# Patient Record
Sex: MALE | Race: WHITE | NOT HISPANIC OR LATINO | Employment: UNEMPLOYED | URBAN - METROPOLITAN AREA
[De-identification: names, ages, dates, MRNs, and addresses within clinical notes are randomized per-mention and may not be internally consistent; named-entity substitution may affect disease eponyms.]

---

## 2022-01-13 ENCOUNTER — HOSPITAL ENCOUNTER (EMERGENCY)
Facility: CLINIC | Age: 37
Discharge: HOME | End: 2022-01-13
Attending: EMERGENCY MEDICINE | Admitting: EMERGENCY MEDICINE
Payer: MEDICAID

## 2022-01-13 VITALS
HEART RATE: 106 BPM | SYSTOLIC BLOOD PRESSURE: 141 MMHG | WEIGHT: 192 LBS | DIASTOLIC BLOOD PRESSURE: 88 MMHG | TEMPERATURE: 98.2 F | OXYGEN SATURATION: 97 % | RESPIRATION RATE: 20 BRPM

## 2022-01-13 DIAGNOSIS — K02.9 DENTAL CARIES: ICD-10-CM

## 2022-01-13 DIAGNOSIS — K04.7 TOOTH ABSCESS: ICD-10-CM

## 2022-01-13 PROCEDURE — 64400 NJX AA&/STRD TRIGEMINAL NRV: CPT

## 2022-01-13 PROCEDURE — 99283 EMERGENCY DEPT VISIT LOW MDM: CPT | Mod: 25

## 2022-01-13 PROCEDURE — 250N000013 HC RX MED GY IP 250 OP 250 PS 637: Performed by: EMERGENCY MEDICINE

## 2022-01-13 RX ORDER — CHLORHEXIDINE GLUCONATE ORAL RINSE 1.2 MG/ML
15 SOLUTION DENTAL 2 TIMES DAILY
Qty: 473 ML | Refills: 0 | Status: SHIPPED | OUTPATIENT
Start: 2022-01-13 | End: 2024-05-31

## 2022-01-13 RX ORDER — PENICILLIN V POTASSIUM 250 MG/1
500 TABLET, FILM COATED ORAL ONCE
Status: COMPLETED | OUTPATIENT
Start: 2022-01-13 | End: 2022-01-13

## 2022-01-13 RX ORDER — PENICILLIN V POTASSIUM 500 MG/1
500 TABLET, FILM COATED ORAL 4 TIMES DAILY
Qty: 40 TABLET | Refills: 0 | Status: SHIPPED | OUTPATIENT
Start: 2022-01-13 | End: 2022-01-23

## 2022-01-13 RX ADMIN — PENICILLIN V POTASSIUM 500 MG: 250 TABLET, FILM COATED ORAL at 13:34

## 2022-01-13 NOTE — DISCHARGE INSTRUCTIONS
Your tooth nerve block will provide relief for the next several hours. From there, you can continue taking Ibuprofen as needed. Take your antibiotics for the next 10 days to help decrease abscess and infection. Also use the chlorhexidine mouth solution like mouth wash 1-2 times a day. You will need to see a dentist to have your tooth removed as soon as possible.    Nursing please provide dental information resources.    Dosing of medications as needed:  Tylenol: 1000 mg 4 times a day  Ibuprofen: 400 mg 3 times a day

## 2022-01-13 NOTE — ED TRIAGE NOTES
Pt here with right sided facial swelling and pain. Was told tooth was cracked. States he is unable to get in to the dentist right now. Started 3-4 days ago.

## 2022-01-13 NOTE — ED PROVIDER NOTES
EMERGENCY DEPARTMENT ENCOUNTER      NAME: Burke Domingo  AGE: 36 year old male  YOB: 1985  MRN: [id number]  EVALUATION DATE & TIME: 1/13/2022 12:55 PM    PCP: No primary care provider on file.    ED PROVIDER: Komal Green MD    Chief Complaint   Patient presents with     Dental Pain     Facial Swelling         FINAL IMPRESSION:  No diagnosis found.      ED COURSE & MEDICAL DECISION MAKING:    Pertinent Labs & Imaging studies reviewed. (See chart for details)  36 year old male with history of *** who presents to the Emergency Department for evaluation of ***           1:00 PM I met with the patient, obtained history, performed an initial exam, and discussed options and plan for diagnostics and treatment here in the ED.   1:27 PM I performed a dental block.  1:49 PM Patient ready for discharge.    At the conclusion of the encounter I discussed the results of all of the tests and the disposition. The questions were answered. The patient or family acknowledged understanding and was agreeable with the care plan.    CONSULTS:  ***    CRITICAL CARE:  ***    MEDICATIONS GIVEN IN THE EMERGENCY:  Medications   penicillin V (VEETID) tablet 500 mg (has no administration in time range)       NEW PRESCRIPTIONS STARTED AT TODAY'S ER VISIT  New Prescriptions    No medications on file          =================================================================    HPI    Patient information was obtained from: ***    Use of Intrepreter: N/A ***, Yes (MARTII *** Phone ***In Person) - Language ***       Burke Domingo is a 36 year old male with pertinent medical history of *** who presents ***      REVIEW OF SYSTEMS  Constitutional:  Denies fever, chills, weight loss or weakness  Eyes:  No pain, discharge, redness  HENT:  Denies sore throat, ear pain, congestion  Respiratory: No SOB, wheeze or cough  Cardiovascular:  No CP, palpitations  GI:  Denies abdominal pain, nausea, vomiting, diarrhea  : Denies dysuria, denies  hematuria  Musculoskeletal:  Denies any new muscle/joint pain, swelling or loss of function.  Skin:  Denies rash, pallor  Neurologic:  Denies headache, focal weakness or sensory changes  Lymph: Denies swollen nodes    All other systems negative unless noted in HPI.      PAST MEDICAL HISTORY:  History reviewed. No pertinent past medical history.    PAST SURGICAL HISTORY:  History reviewed. No pertinent surgical history.    CURRENT MEDICATIONS:    None       ALLERGIES:  Allergies   Allergen Reactions     Morphine Shortness Of Breath and Rash     Apple [Apple Fruit Extract] Unknown       FAMILY HISTORY:  History reviewed. No pertinent family history.    SOCIAL HISTORY:  Social History     Tobacco Use     Smoking status: None     Smokeless tobacco: None   Substance Use Topics     Alcohol use: None     Drug use: None        VITALS:  Patient Vitals for the past 24 hrs:   BP Temp Pulse Resp SpO2 Weight   01/13/22 1251 -- -- -- -- -- 87.1 kg (192 lb)   01/13/22 1247 (!) 141/88 98.2  F (36.8  C) 106 20 97 % --       PHYSICAL EXAM    General Appearance: Well-appearing, well-nourished, no acute distress ***  Head:  Normocephalic, atraumatic  Eyes:  PERRL, conjunctiva/corneas clear, EOM's intact  ENT:  Lips, mucosa, and tongue normal; membranes are moist without pallor  Neck:  Supple, symmetrical, trachea midline, no adenopathy  Chest:  No tenderness or deformity, no crepitus  Cardio:  Regular rate and rhythm, no murmur/gallop/rub, 2+ pulses symmetric in all extremities  Pulm:  No respiratory distress, clear to auscultation bilaterally  Back:  No midline tenderness to palpation, no paraspinal tenderness, No CVA tenderness, normal ROM  Abdomen:  Soft, non-tender, non distended,no rebound or guarding.  Extremities:  Extremities normal, atraumatic, no cyanosis or edema, full ROM and motor tone intact, bilateral pulses intact upper and lower  Skin:  Skin warm, dry, no rashes  Neuro:  Alert and oriented ×3, moving all extremities,  no gross sensory defects     RADIOLOGY/LABS:  Reviewed all pertinent imaging. Please see official radiology report. All pertinent labs reviewed and interpreted.         EKG:  Performed at: ***    Impression: ***    Rate: ***  Rhythm: ***  Axis: ***  FL Interval: ***  QRS Interval: ***  QTc Interval: ***  ST Changes: ***  Comparison: ***  I have independently reviewed and interpreted the EKG(s) documented above.    The creation of this record is based on the scribe s observations of the work being performed by Komal Green MD and the provider s statements to them. It was created on his behalf by Fidelia Junior, a trained medical scribe. This document has been checked and approved by the attending provider.    Komal Green MD  Emergency Medicine  [facility] EMERGENCY ROOM  [address] [facility] [id number] [phone]  Dept: [phone]

## 2022-01-13 NOTE — ED PROVIDER NOTES
IKomal have reviewed the documentation, personally taken the patient's history, performed an exam and agree with the physical finds, diagnosis and management plan.    HPI:  35 y/o m here for c/o dental pain.  Worsening after he chipped his right mandibular posterior most molar on a hard candy several days ago.  Patient notes some swelling to the right face.  He notes that when he pushes on that area and sucks he can get some pus out of the dental bed.    Physical Exam: On exam patient is well-appearing.  He does have some swelling over the right mandibular region anterior to the angle of the jaw.  There is no overlying erythema within this region.  The floor the mouth is soft and overall his dentition is quite poor with multiple carious teeth.  The mandibular posterior most molar is largely carious with a fracture of one of the cusps and pus visible with exposed pulp.  There is not however any palpable or drainable abscess along the gingival or buccal mucosa.    MDM: Symptoms are consistent with dental carry/dental fracture with associated likely periapical abscess.  Again no evidence of drainable abscess, facial cellulitis, Garry's angina.    ED Course/workup: Patient offered dental block, which he was agreeable to.  Given inferior alveolar block, see procedure note below which he tolerated well and had good relief thereafter.  Given Pen-Vee K, prescription for Pen-Vee K, chlorhexidine, alternating Tylenol ibuprofen and dental resources for home.             PROCEDURES:  PROCEDURE: Dental Nerve Block   INDICATIONS: Dental pain   PROCEDURE PROVIDER: Dr Komal Green   SITE: Right Inferior Alveolar (mandibular) Nerve to achieve anesthesia of right posterior most mandibular molar     MEDICATION: 2 mL of 0.25% Bupivacaine without epinephrine   NOTE: The usual right mandibular landmarks were identified and the needle was positioned at the midpoint of the mandibular borders.  Area was aspirated and there was  no return of blood.  I then injected the medication into the site.    COMPLICATIONS: Patient tolerated procedure well, without complication            Final Diagnosis:     ICD-10-CM    1. Tooth abscess  K04.7    2. Dental caries  K02.9            I personally saw the patient and performed a substantive portion of the visit including all aspects of the medical decision making.     MD Peter Escobar Zabrina N, MD  01/13/22 1340

## 2022-01-13 NOTE — ED PROVIDER NOTES
"EMERGENCY DEPARTMENT ENCOUNTER      CHIEF COMPLAINT      Chief Complaint   Patient presents with    Dental Pain    Facial Swelling       SHEEBA Turk is a 36 year old male who presents to the ED for dental pain. He states it began 4 days ago. It began after biting down on a hard candy and chipping his right furthest back molar. He states his lower face has then become swollen and he can feel it pulsate. Taking Ibuprofen allows him to press on his right cheek and suck \"pus and blood\" out. He has a bit of relief after doing that. He states that he has tried to get into a dentist, but he states he cannot get in because he is not from the area and many places are not taking walk-ins due to COVID. He is visiting family from [place].    He smokes 0.25 pack of cigarettes daily. He drinks socially and mostly only on weekends. He denies any illicit drug use.      REVIEW OF SYSTEMS      Constitutional: Negative for fever, chills, and fatigue.     HENT: Negative for neck pain.      Eyes: Negative for visual disturbance.     Respiratory: Negative for chest tightness and shortness of breath.      Cardiovascular: Negative for chest pain.     Gastrointestinal: Negative for nausea, vomiting, abdominal pain and diarrhea.     Genitourinary: Negative for dysuria.     Musculoskeletal: Negative for back pain.     Skin: Negative for color change.     Neurological: Negative for dizziness, weakness and numbness.     All other systems reviewed and are negative.      PAST MEDICAL HISTORY      History reviewed. No pertinent past medical history. and There is no problem list on file for this patient.        SURGICAL HISTORY      Reviewed, Non-contributory      CURRENT MEDICATIONS      Patient's Medications   New Prescriptions    CHLORHEXIDINE (PERIDEX) 0.12 % SOLUTION    Swish and spit 15 mLs in mouth 2 times daily    PENICILLIN V (VEETID) 500 MG TABLET    Take 1 tablet (500 mg) by mouth 4 times daily for 10 days   Previous Medications    " No medications on file   Modified Medications    No medications on file   Discontinued Medications    No medications on file         ALLERGIES      Allergies   Allergen Reactions    Morphine Shortness Of Breath and Rash    Apple [Apple Fruit Extract] Unknown         FAMILY HISTORY      History reviewed. No pertinent family history.      SOCIAL HISTORY      Social History     Socioeconomic History    Marital status: Single     Spouse name: None    Number of children: None    Years of education: None    Highest education level: None   Occupational History    None   Tobacco Use    Smoking status: None    Smokeless tobacco: None   Substance and Sexual Activity    Alcohol use: None    Drug use: None    Sexual activity: None   Other Topics Concern    None   Social History Narrative    None     Social Determinants of Health     Financial Resource Strain: Not on file   Food Insecurity: Not on file   Transportation Needs: Not on file   Physical Activity: Not on file   Stress: Not on file   Social Connections: Not on file   Intimate Partner Violence: Not on file   Housing Stability: Not on file         PHYSICAL EXAM      VITAL SIGNS: BP (!) 141/88   Pulse 106   Temp 98.2  F (36.8  C)   Resp 20   Wt 87.1 kg (192 lb)   SpO2 97%        Constitutional:  Well developed, well nourished, no acute distress     EYES: Conjunctivae clear    HENT:  Atraumatic, external ears normal, nose normal, oropharynx moist. Palpable right tonsillar lymph node. Abscess formation palpated on right mandible - fluctuant and tender. Poor gingival and dental care - multiple cavities. Tenderness to palpation of right lower furthest back tooth with visible pus observed. No tenderness to palpation of floor of mouth.    Respiratory:  No respiratory distress, normal breath sounds, no rales, no wheezing     Cardiovascular:  Normal rate, normal rhythm, no murmurs, capillary refill normal.  No chest tenderness    GI:  Soft, nondistended, nontender, no  palpable masses, no rebound, no guarding     Musculoskeletal:  No edema.  Range of motion major extremities intact. No tenderness to palpation or major deformities noted.      Integument: Warm, Dry, No erythema, No rash.     Neurologic:  Alert & oriented, no focal deficits noted, ambulatory    Psych: Affect normal, Mood normal.      Procedure: Right lower mandible nerve block        ED COURSE & MEDICAL DECISION MAKING    Patient was seen in the emergency department and history was taken. Patient was seeking pain relief and agreed to dental block. Procedure was performed and patient was given good relief. He started his penicillin course today in the emergency department. He was encouraged to make appointment with dentist to have tooth extracted.      Yeimy Joaquin MD PGY1  [facility] Family Medicine Resident     Yeimy Joaquin MD  Resident  01/13/22 1412       Komal Green MD  01/20/22 1402

## 2022-01-19 ENCOUNTER — HOSPITAL ENCOUNTER (EMERGENCY)
Facility: CLINIC | Age: 37
Discharge: HOME | End: 2022-01-19
Attending: EMERGENCY MEDICINE | Admitting: EMERGENCY MEDICINE
Payer: MEDICAID

## 2022-01-19 VITALS
HEART RATE: 105 BPM | HEIGHT: 68 IN | RESPIRATION RATE: 16 BRPM | WEIGHT: 180 LBS | BODY MASS INDEX: 27.28 KG/M2 | SYSTOLIC BLOOD PRESSURE: 137 MMHG | DIASTOLIC BLOOD PRESSURE: 81 MMHG | TEMPERATURE: 97.3 F | OXYGEN SATURATION: 99 %

## 2022-01-19 DIAGNOSIS — K04.7 DENTAL ABSCESS: ICD-10-CM

## 2022-01-19 PROCEDURE — 99284 EMERGENCY DEPT VISIT MOD MDM: CPT | Mod: 25

## 2022-01-19 PROCEDURE — 250N000013 HC RX MED GY IP 250 OP 250 PS 637: Performed by: EMERGENCY MEDICINE

## 2022-01-19 PROCEDURE — 41800 DRAINAGE OF GUM LESION: CPT

## 2022-01-19 PROCEDURE — 250N000009 HC RX 250: Performed by: EMERGENCY MEDICINE

## 2022-01-19 RX ORDER — CLINDAMYCIN HCL 150 MG
450 CAPSULE ORAL ONCE
Status: COMPLETED | OUTPATIENT
Start: 2022-01-19 | End: 2022-01-19

## 2022-01-19 RX ORDER — HYDROCODONE BITARTRATE AND ACETAMINOPHEN 5; 325 MG/1; MG/1
1 TABLET ORAL ONCE
Status: COMPLETED | OUTPATIENT
Start: 2022-01-19 | End: 2022-01-19

## 2022-01-19 RX ORDER — LIDOCAINE HYDROCHLORIDE 20 MG/ML
5 SOLUTION OROPHARYNGEAL ONCE
Status: COMPLETED | OUTPATIENT
Start: 2022-01-19 | End: 2022-01-19

## 2022-01-19 RX ORDER — CLINDAMYCIN HCL 150 MG
450 CAPSULE ORAL 3 TIMES DAILY
Qty: 63 CAPSULE | Refills: 0 | Status: SHIPPED | OUTPATIENT
Start: 2022-01-19 | End: 2022-01-26

## 2022-01-19 RX ORDER — HYDROCODONE BITARTRATE AND ACETAMINOPHEN 5; 325 MG/1; MG/1
1 TABLET ORAL EVERY 6 HOURS PRN
Qty: 12 TABLET | Refills: 0 | Status: SHIPPED | OUTPATIENT
Start: 2022-01-19 | End: 2022-09-27

## 2022-01-19 RX ADMIN — HYDROCODONE BITARTRATE AND ACETAMINOPHEN 1 TABLET: 5; 325 TABLET ORAL at 17:23

## 2022-01-19 RX ADMIN — LIDOCAINE HYDROCHLORIDE 5 ML: 20 SOLUTION ORAL; TOPICAL at 17:22

## 2022-01-19 RX ADMIN — CLINDAMYCIN HYDROCHLORIDE 450 MG: 150 CAPSULE ORAL at 17:28

## 2022-01-19 ASSESSMENT — ENCOUNTER SYMPTOMS
CHILLS: 0
HEADACHES: 0
FACIAL SWELLING: 1
NECK PAIN: 0
TROUBLE SWALLOWING: 0
FEVER: 0

## 2022-01-19 ASSESSMENT — MIFFLIN-ST. JEOR: SCORE: 1725.97

## 2022-01-19 NOTE — DISCHARGE INSTRUCTIONS
add   a teaspoon of salt to a cup of warm water swish and spit every couple hours while awake until your symptoms are improving.  Switch antibiotic from penicillin to clindamycin make sure to take another dosage tonight before you go to bed.  Vicodin for pain.  Expect improvement your symptoms over the next 24 hours.  If tomorrow things are worsening and not improving please return to the emergency department for repeat examination.

## 2022-01-19 NOTE — ED PROVIDER NOTES
EMERGENCY DEPARTMENT ENCOUNTER      NAME: Burke Domingo  AGE: 36 year old male  YOB: 1985  MRN: [id number]  EVALUATION DATE & TIME: 1/19/2022  4:23 PM    PCP: System, Provider Not In    ED PROVIDER: Manish Lainez MD      Chief Complaint   Patient presents with     Dental Pain     FINAL IMPRESSION:  1. Dental abscess      ED COURSE & MEDICAL DECISION MAKING:    Pertinent Labs & Imaging studies reviewed. (See chart for details)  4:29 PM I met with the patient to gather history and to perform my initial exam. We discussed plans for the ED course, including diagnostic testing and treatment.   5:36 PM I rechecked the patient and performed an incision and drainage. We discussed the plan for discharge.    36 year old male presents to the Emergency Department for evaluation of dental pain and worsening facial swelling.  Patient evaluated in his emergency department 6 days prior after cracking his tooth and developing facial swelling.  He was started on penicillin.  He has been controlling his pain with ibuprofen.  Despite being on the antibiotic he has had progressive worsening of his symptoms with worsening facial swelling.  No fevers no chills no difficulty swallowing does hurt to open his mouth in the area of his swelling.  No neck pain no headache.  History of poor dentition.  Had been trying to get into a dentist but was unable to secure an appointment until early February.  Presents emergency department due to escalation of his condition.  On examination patient was afebrile.  Mildly tachycardic I think secondary to pain his heart rate was 105 he was appearing uncomfortable his blood pressure was normal.  Otherwise well-appearing.  On intraoral examination there was a cracked molar most posterior on the lower right side there was significant facial swelling on the right side no overlying erythema no palpable crepitus.  There was a clear abscess lateral to the fractured tooth that was easily  identifiable with intraoral palpation there was no sublingual tenderness no posterior oropharyngeal abnormalities or other significant intraoral issues outside of poor dentition.  I recommended to the patient that we incise and drain the abscess as likely he is not improving secondary to a persistent abscess.  Patient verbally consented to proceed with the procedure I anesthetized the area initially with topical lidocaine gel and then subsequently with a injection of 3 cc of Marcaine plus epinephrine.  After anesthesia was achieved I did a stab incision and copious amounts of foul-smelling purulent material then emanated from the abscess.  Patient spent 10 to 15 minutes swishing and spitting mostly pus little bit of blood.  The face was still swollen but notably improved compared to his initial presentation.  I had a discussion with the patient he was concerned given there still was some swelling that there could be another abscess.  I think it is most likely more edema and inflammation related to his infection as opposed to a second abscess or loculations although I did discuss with him that it certainly possible that there is multiple pockets to the abscess.  I reexamined the patient intraorally and again palpated around I did not identify another area that was amenable to surgical drainage.  I offered to the patient explaining to him that the only way at this point we could identify if there was a retained abscess pocket is to perform CT scan imaging.  This was necessitate IV placement and some laboratory testing.  The patient at this point wants to hold off on that.  He wants to see if things improve on new antibiotic and status post drainage. I think based on his overall clinical appearance and the success in drainage and that that is a reasonable plan of care at this time. I do think it is likely that this the patient will see significant clinical improvement over the next 12 to 24 hours with the amount of pus  that emanated from his wound.  He is nontoxic-appearing.  He is otherwise looking well I think he is appropriate for continued close monitoring on outpatient basis.  We are changing his antibiotic to clindamycin will be discharged on a short course of pain pills he has instructions to return tomorrow if he does not feel like the symptoms are significantly improved and they are going to work to get into the dental clinic earlier.       At the conclusion of the encounter I discussed the results of all of the tests and the disposition. The questions were answered. The patient or family acknowledged understanding and was agreeable with the care plan.     PPE worn: n95 mask.     MEDICATIONS GIVEN IN THE EMERGENCY:  Medications   lidocaine (viscous) (XYLOCAINE) 2 % solution 5 mL (5 mLs Mouth/Throat Given 1/19/22 1722)   clindamycin (CLEOCIN) capsule 450 mg (450 mg Oral Given 1/19/22 1728)   HYDROcodone-acetaminophen (NORCO) 5-325 MG per tablet 1 tablet (1 tablet Oral Given 1/19/22 1723)       NEW PRESCRIPTIONS STARTED AT TODAY'S ER VISIT  New Prescriptions    CLINDAMYCIN (CLEOCIN) 150 MG CAPSULE    Take 3 capsules (450 mg) by mouth 3 times daily for 7 days    HYDROCODONE-ACETAMINOPHEN (NORCO) 5-325 MG TABLET    Take 1 tablet by mouth every 6 hours as needed for severe pain          =================================================================    HPI    Patient information was obtained from: patient     Use of : N/A        Burke ROSENDO Domingo is a 36 year old male with no pertinent history on record who presents to this ED via walk-in for evaluation of dental pain.    Per chart review, the patient presented to [facility] ED on 1/13/22 with dental pain and right sided facial swelling. Patient underwent a dental block and was started on Penicillin 500 mg QID x10 days.     Patient presents with worsening right sided dental pain and facial swelling. His pain began on 1/9 after biting down on a hard candy and chipping  his right furthest back molar. He states his lower face has become swollen and he can feel it pulsate. The swelling has progressively worsened and escalated in size despite taking the penicillin. Patient reports the swelling is isolated to the right side of his face and is in the same location as the previous visit. He has tried getting in to see a dentist but couldn't secure an appointment before February. He has been taking ibuprofen with adequate pain relief. Patient denies fever, chills, difficulty swallowing, headache, or neck pain. No other complaints or concerns expressed at this time.      REVIEW OF SYSTEMS   Review of Systems   Constitutional: Negative for chills and fever.   HENT: Positive for dental problem and facial swelling (right). Negative for trouble swallowing.    Musculoskeletal: Negative for neck pain.   Neurological: Negative for headaches.   All other systems reviewed and are negative.       PAST MEDICAL HISTORY:  History reviewed. No pertinent past medical history.    PAST SURGICAL HISTORY:  History reviewed. No pertinent surgical history.        CURRENT MEDICATIONS:    clindamycin (CLEOCIN) 150 MG capsule  HYDROcodone-acetaminophen (NORCO) 5-325 MG tablet  chlorhexidine (PERIDEX) 0.12 % solution  penicillin V (VEETID) 500 MG tablet        ALLERGIES:  Allergies   Allergen Reactions     Morphine Shortness Of Breath and Rash     Apple [Apple Fruit Extract] Unknown       FAMILY HISTORY:  History reviewed. No pertinent family history.    SOCIAL HISTORY:   Social History     Socioeconomic History     Marital status: Single     Spouse name: None     Number of children: None     Years of education: None     Highest education level: None   Occupational History     None   Tobacco Use     Smoking status: None     Smokeless tobacco: None   Substance and Sexual Activity     Alcohol use: None     Drug use: None     Sexual activity: None   Other Topics Concern     None   Social History Narrative     None  "    Social Determinants of Health     Financial Resource Strain: Not on file   Food Insecurity: Not on file   Transportation Needs: Not on file   Physical Activity: Not on file   Stress: Not on file   Social Connections: Not on file   Intimate Partner Violence: Not on file   Housing Stability: Not on file       VITALS:  /81   Pulse 105   Temp 97.3  F (36.3  C) (Oral)   Resp 16   Ht 1.727 m (5' 8\")   Wt 81.6 kg (180 lb)   SpO2 99%   BMI 27.37 kg/m      PHYSICAL EXAM    PHYSICAL EXAM    VITAL SIGNS: /81   Pulse 105   Temp 97.3  F (36.3  C) (Oral)   Resp 16   Ht 1.727 m (5' 8\")   Wt 81.6 kg (180 lb)   SpO2 99%   BMI 27.37 kg/m    Constitutional:  Well developed, well nourished   EYES: Conjunctivae clear, no discharge  HENT: Right-sided facial swelling no overlying erythema mild tenderness to palpation no palpable crepitus externally intraorally there is poor dentition throughout there is a fractured molar most posterior on the lower right side there is intraoral swelling lateral to that tooth extending anteriorly with a palpable area of fluctuance no sublingual tenderness no lymphadenopathy no meningeal signs to clinical examination moving jaw fine no posterior oropharyngeal abnormalities or other abnormalities intraorally  Neck: Normal ROM , Supple   Respiratory:  No respiratory distress, normal nonlabored respirations.   Cardiovascular:  Distal perfusion appears intact  Musculoskeletal:  No edema appreciated, Good range of motion in all major joints.   Integument:  Warm, Dry, No erythema, No rash.   Neurologic:  Alert and oriented. No focal deficits noted.  Ambulatory  Psychiatric:  Affect normal, Judgment normal, Mood normal.    PROCEDURES:     PROCEDURE: Incision & Drainage   INDICATIONS: Abscess   PROCEDURE PROVIDER: Dr. Lainez   CONSENT: Verbal, after discussing risks/benefits/alternatives   LOCATION:  Lateral to the right lower molars   TYPE/SIZE:  Moderate in size   INCISION: #11 " blade, 1 cm long incision   ANESTHESIA: Lidocaine 2%, 5 mLs   DRAINAGE: Copious purulent drainage foul-smelling   IRRIGATION  oral H2 O swish and spit   PACKING?: None           I, Harvinder Chneg, am serving as a scribe to document services personally performed by Dr. Manish Lainez based on my observation and the provider's statements to me. I, Manish Lainez MD, attest that Harvinder Cheng is acting in a scribe capacity, has observed my performance of the services and has documented them in accordance with my direction.    Manish Lainez MD  Emergency Medicine  [facility] EMERGENCY ROOM  [address] [facility] [address] [address] [facility] [id number] [phone]     Manish Lainez MD  01/19/22 2033

## 2022-05-25 ENCOUNTER — HOSPITAL ENCOUNTER (EMERGENCY)
Facility: CLINIC | Age: 37
Discharge: HOME | End: 2022-05-25
Attending: EMERGENCY MEDICINE | Admitting: EMERGENCY MEDICINE
Payer: COMMERCIAL

## 2022-05-25 VITALS
SYSTOLIC BLOOD PRESSURE: 147 MMHG | DIASTOLIC BLOOD PRESSURE: 91 MMHG | OXYGEN SATURATION: 97 % | BODY MASS INDEX: 26.91 KG/M2 | WEIGHT: 177 LBS | RESPIRATION RATE: 18 BRPM | TEMPERATURE: 99.7 F | HEART RATE: 107 BPM

## 2022-05-25 DIAGNOSIS — K04.7 DENTAL INFECTION: ICD-10-CM

## 2022-05-25 PROCEDURE — 250N000013 HC RX MED GY IP 250 OP 250 PS 637: Performed by: EMERGENCY MEDICINE

## 2022-05-25 PROCEDURE — 99283 EMERGENCY DEPT VISIT LOW MDM: CPT

## 2022-05-25 RX ORDER — CLINDAMYCIN HCL 300 MG
300 CAPSULE ORAL 4 TIMES DAILY
Qty: 28 CAPSULE | Refills: 0 | Status: SHIPPED | OUTPATIENT
Start: 2022-05-25 | End: 2022-06-01

## 2022-05-25 RX ORDER — CLINDAMYCIN HCL 150 MG
300 CAPSULE ORAL ONCE
Status: COMPLETED | OUTPATIENT
Start: 2022-05-25 | End: 2022-05-25

## 2022-05-25 RX ORDER — IBUPROFEN 600 MG/1
600 TABLET, FILM COATED ORAL ONCE
Status: COMPLETED | OUTPATIENT
Start: 2022-05-25 | End: 2022-05-25

## 2022-05-25 RX ADMIN — IBUPROFEN 600 MG: 600 TABLET ORAL at 22:19

## 2022-05-25 RX ADMIN — CLINDAMYCIN HYDROCHLORIDE 300 MG: 150 CAPSULE ORAL at 22:19

## 2022-05-25 ASSESSMENT — ENCOUNTER SYMPTOMS: FACIAL SWELLING: 1

## 2022-05-25 NOTE — Clinical Note
Burke Domingo was seen and treated in our emergency department on 5/25/2022.  He may return to work on 05/27/2022.       If you have any questions or concerns, please don't hesitate to call.      Zeus Benitez, DO

## 2022-05-26 NOTE — ED PROVIDER NOTES
EMERGENCY DEPARTMENT ENCOUNTER      NAME: Burke Domingo  AGE: 36 year old male  YOB: 1985  MRN: [id number]  EVALUATION DATE & TIME: 2022  9:01 PM    PCP: System, Provider Not In    ED PROVIDER: Zeus Benitez DO      Chief Complaint   Patient presents with     Dental Pain         FINAL IMPRESSION:  1. Dental infection          ED COURSE & MEDICAL DECISION MAKIN-year-old male presented to the ED for evaluation of dental pain located in his right lower jaw has been ongoing for last few days.  The patient also reported associated mild facial swelling.  The patient was seen in the ED in January of this year for an infection in the same tooth.  Patient had had not yet followed up with a dentist for removal of the tooth.  Upon arrival to the ED the patient was noted to be hypertensive and tachycardic.  This is likely due to his pain.  On exam there was significant dental decay and fracture noted at tooth #31.  There was no clear dental abscess or fluctuant area noted.  No significant subungual or posterior pharyngeal swelling is noted.  The patient was noted to have mild swelling in the right lower face without evidence of fluctuance or crepitus.       Following his initial evaluation the patient was again educated about dental infections and reassured.  The patient was informed that there was no clear abscess that need an incision and draining here today in the ED.  Patient was given a dose of oral clindamycin and ibuprofen for pain control.  He declined dental block or IM Toradol.      The patient states that he has an appointment scheduled with a dental clinic within the next 2 to 3 weeks.  Patient was sent home with clindamycin to take for the next 7 days for the dental infection.  He was instructed to use over-the-counter ibuprofen as needed for any further pain.  The patient was instructed to return back to ED for any worsening facial swelling, pain, or any other new or concerning  symptoms.    Pertinent Labs & Imaging studies reviewed. (See chart for details)  9:35 PM I met with the patient to gather history and to perform my initial exam. We discussed plans for the ED course, including diagnostic testing and treatment.   10:11 PM I discussed the plan for discharge with the patient, and patient is agreeable. We discussed supportive cares at home and reasons for return to the ER including new or worsening symptoms - all questions and concerns addressed. Patient to be discharged by RN.       At the conclusion of the encounter I discussed the results of all of the tests and the disposition. The questions were answered. The patient or family acknowledged understanding and was agreeable with the care plan.       PPE worn: n95 mask, goggles    MEDICATIONS GIVEN IN THE EMERGENCY:  Medications   clindamycin (CLEOCIN) capsule 300 mg (300 mg Oral Given 5/25/22 2219)   ibuprofen (ADVIL/MOTRIN) tablet 600 mg (600 mg Oral Given 5/25/22 2219)       NEW PRESCRIPTIONS STARTED AT TODAY'S ER VISIT  Discharge Medication List as of 5/25/2022 10:20 PM      START taking these medications    Details   clindamycin (CLEOCIN) 300 MG capsule Take 1 capsule (300 mg) by mouth 4 times daily for 7 days, Disp-28 capsule, R-0, Local Print                =================================================================    HPI    Patient information was obtained from: The patient    Use of : N/A       Burke ROBBINS Jose L is a 36 year old male with no recorded pertient medical history who presents to this ED via walk-in for evaluation of dental pain.    The patient reports that on 5/23 (2 days ago) he noticed mild swelling to his right face. Since onset, the swelling has progressively worsened and he has developed a right lower dental pain. The patient has been using Tylenol without relief. He had similar symptoms in January of 2022 and had a dental abscess drained and the use of antibiotics resolved his symptoms. The  patient has a dentist appointment next month with the [facility] -- he states it has been hard to find a dentist because not many people take his insurance. The patient denies any other symptoms or complaints at this time.     Per Chart Review,  1/19/22 The patient [facility] for evaluation of dental pain. The patient had a dental abscess drained. He was prescribed clindamycin.      REVIEW OF SYSTEMS   Review of Systems   HENT: Positive for dental problem and facial swelling.    All other systems reviewed and are negative.       PAST MEDICAL HISTORY:  History reviewed. No pertinent past medical history.    PAST SURGICAL HISTORY:  History reviewed. No pertinent surgical history.        CURRENT MEDICATIONS:    clindamycin (CLEOCIN) 300 MG capsule  chlorhexidine (PERIDEX) 0.12 % solution  HYDROcodone-acetaminophen (NORCO) 5-325 MG tablet        ALLERGIES:  Allergies   Allergen Reactions     Morphine Shortness Of Breath and Rash     Apple [Apple Fruit Extract] Unknown       FAMILY HISTORY:  History reviewed. No pertinent family history.    SOCIAL HISTORY:   Social History     Socioeconomic History     Marital status: Single     Spouse name: None     Number of children: None     Years of education: None     Highest education level: None       VITALS:  BP (!) 147/91   Pulse 107   Temp 99.7  F (37.6  C)   Resp 18   Wt 80.3 kg (177 lb)   SpO2 97%   BMI 26.91 kg/m      PHYSICAL EXAM    General presentation: Alert, Vital signs reviewed. No apparent distress. Uncomfortable appearing.   HENT: Significant dental decay noted throughout the oral cavity.  Chronic appearing dental fracture with significant decay noted at tooth #31.  No clear dental abscess noted.  No subungual or posterior pharyngeal swelling.  Mild swelling noted within the right lower face without evidence of crepitus or fluctuance.   Eye: Pupils are equal and reactive to light. EOMI  Neck: The neck is supple.  Pulmonary: Currently in no acute  respiratory distress.   Circulatory: Peripheral pulses are strong and equal in the bilateral upper lower extremities.   Neurologic: Alert, oriented to person, place, and time. No gross motor or sensory deficits noted in the upper or lower extremities. Cranial nerves II through XII are grossly intact.  Musculoskeletal: No extremity tenderness. Full range of motion in all extremities. No extremity edema.   Skin: Skin color is normal. No rash. Warm. Dry to touch.       I, Barry Perkins, am serving as a scribe to document services personally performed by Zeus Benitez DO based on my observation and the provider's statements to me. I, Zeus Benitez, attest that Barry Perkins is acting in a scribe capacity, has observed my performance of the services and has documented them in accordance with my direction.    Zeus Benitez DO  Emergency Medicine  [facility] EMERGENCY ROOM  [address] [id number] [phone]     Zeus Benitez DO  05/25/22 8774

## 2022-05-26 NOTE — DISCHARGE INSTRUCTIONS
Take the antibiotic clindamycin as directed to treat the dental infection.  Continue using over-the-counter ibuprofen 600 or 800 mg every 8 hours as needed for pain.  Follow-up with the dentist for definitive treatment of the dental infection.  Return back to ED sooner for any worsening pain, swelling, or any other new or concerning symptoms.

## 2022-09-20 PROCEDURE — 96375 TX/PRO/DX INJ NEW DRUG ADDON: CPT

## 2022-09-26 ENCOUNTER — APPOINTMENT (OUTPATIENT)
Dept: CT IMAGING | Facility: HOSPITAL | Age: 37
End: 2022-09-26
Attending: FAMILY MEDICINE
Payer: COMMERCIAL

## 2022-09-26 LAB
ANION GAP SERPL CALCULATED.3IONS-SCNC: 12 MMOL/L (ref 7–15)
BUN SERPL-MCNC: 4.4 MG/DL (ref 6–20)
CALCIUM SERPL-MCNC: 9.4 MG/DL (ref 8.6–10)
CHLORIDE SERPL-SCNC: 99 MMOL/L (ref 98–107)
CREAT SERPL-MCNC: 0.71 MG/DL (ref 0.67–1.17)
DEPRECATED HCO3 PLAS-SCNC: 30 MMOL/L (ref 22–29)
ERYTHROCYTE [DISTWIDTH] IN BLOOD BY AUTOMATED COUNT: 12.8 % (ref 10–15)
GFR SERPL CREATININE-BSD FRML MDRD: >90 ML/MIN/1.73M2
GLUCOSE SERPL-MCNC: 93 MG/DL (ref 70–99)
HCT VFR BLD AUTO: 44.9 % (ref 40–53)
HGB BLD-MCNC: 15.6 G/DL (ref 13.3–17.7)
HOLD SPECIMEN: NORMAL
HOLD SPECIMEN: NORMAL
MCH RBC QN AUTO: 34.3 PG (ref 26.5–33)
MCHC RBC AUTO-ENTMCNC: 34.7 G/DL (ref 31.5–36.5)
MCV RBC AUTO: 99 FL (ref 78–100)
PLATELET # BLD AUTO: 195 10E3/UL (ref 150–450)
POTASSIUM SERPL-SCNC: 3.4 MMOL/L (ref 3.4–5.3)
RBC # BLD AUTO: 4.55 10E6/UL (ref 4.4–5.9)
SODIUM SERPL-SCNC: 141 MMOL/L (ref 136–145)
WBC # BLD AUTO: 11 10E3/UL (ref 4–11)

## 2022-09-26 PROCEDURE — 85014 HEMATOCRIT: CPT | Performed by: STUDENT IN AN ORGANIZED HEALTH CARE EDUCATION/TRAINING PROGRAM

## 2022-09-26 PROCEDURE — 99285 EMERGENCY DEPT VISIT HI MDM: CPT | Mod: 25

## 2022-09-26 PROCEDURE — 82310 ASSAY OF CALCIUM: CPT | Performed by: STUDENT IN AN ORGANIZED HEALTH CARE EDUCATION/TRAINING PROGRAM

## 2022-09-26 PROCEDURE — 96375 TX/PRO/DX INJ NEW DRUG ADDON: CPT

## 2022-09-26 PROCEDURE — 36415 COLL VENOUS BLD VENIPUNCTURE: CPT | Performed by: STUDENT IN AN ORGANIZED HEALTH CARE EDUCATION/TRAINING PROGRAM

## 2022-09-26 PROCEDURE — 70487 CT MAXILLOFACIAL W/DYE: CPT

## 2022-09-27 ENCOUNTER — HOSPITAL ENCOUNTER (EMERGENCY)
Facility: HOSPITAL | Age: 37
Discharge: HOME | End: 2022-09-27
Attending: FAMILY MEDICINE | Admitting: FAMILY MEDICINE
Payer: COMMERCIAL

## 2022-09-27 VITALS
OXYGEN SATURATION: 97 % | HEIGHT: 67 IN | HEART RATE: 90 BPM | DIASTOLIC BLOOD PRESSURE: 95 MMHG | TEMPERATURE: 98.7 F | SYSTOLIC BLOOD PRESSURE: 148 MMHG | WEIGHT: 180 LBS | BODY MASS INDEX: 28.25 KG/M2 | RESPIRATION RATE: 16 BRPM

## 2022-09-27 DIAGNOSIS — L02.01 ACUTE ABSCESS OF FACE: ICD-10-CM

## 2022-09-27 DIAGNOSIS — L03.211 FACIAL CELLULITIS: ICD-10-CM

## 2022-09-27 PROCEDURE — 10060 I&D ABSCESS SIMPLE/SINGLE: CPT

## 2022-09-27 PROCEDURE — 250N000011 HC RX IP 250 OP 636: Performed by: STUDENT IN AN ORGANIZED HEALTH CARE EDUCATION/TRAINING PROGRAM

## 2022-09-27 PROCEDURE — 96365 THER/PROPH/DIAG IV INF INIT: CPT | Mod: 59

## 2022-09-27 PROCEDURE — 250N000011 HC RX IP 250 OP 636: Performed by: FAMILY MEDICINE

## 2022-09-27 RX ORDER — DOXYCYCLINE 100 MG/1
100 CAPSULE ORAL 2 TIMES DAILY
Qty: 20 CAPSULE | Refills: 0 | Status: SHIPPED | OUTPATIENT
Start: 2022-09-27 | End: 2022-10-07

## 2022-09-27 RX ORDER — KETOROLAC TROMETHAMINE 15 MG/ML
15 INJECTION, SOLUTION INTRAMUSCULAR; INTRAVENOUS ONCE
Status: COMPLETED | OUTPATIENT
Start: 2022-09-27 | End: 2022-09-27

## 2022-09-27 RX ORDER — PIPERACILLIN SODIUM, TAZOBACTAM SODIUM 3; .375 G/15ML; G/15ML
3.38 INJECTION, POWDER, LYOPHILIZED, FOR SOLUTION INTRAVENOUS ONCE
Status: COMPLETED | OUTPATIENT
Start: 2022-09-27 | End: 2022-09-27

## 2022-09-27 RX ORDER — IOPAMIDOL 755 MG/ML
100 INJECTION, SOLUTION INTRAVASCULAR ONCE
Status: COMPLETED | OUTPATIENT
Start: 2022-09-27 | End: 2022-09-27

## 2022-09-27 RX ORDER — HYDROCODONE BITARTRATE AND ACETAMINOPHEN 5; 325 MG/1; MG/1
1 TABLET ORAL EVERY 6 HOURS PRN
Qty: 6 TABLET | Refills: 0 | Status: SHIPPED | OUTPATIENT
Start: 2022-09-27 | End: 2022-09-30

## 2022-09-27 RX ADMIN — KETOROLAC TROMETHAMINE 15 MG: 15 INJECTION, SOLUTION INTRAMUSCULAR; INTRAVENOUS at 02:42

## 2022-09-27 RX ADMIN — IOPAMIDOL 100 ML: 755 INJECTION, SOLUTION INTRAVENOUS at 00:09

## 2022-09-27 RX ADMIN — PIPERACILLIN AND TAZOBACTAM 3.38 G: 3; .375 INJECTION, POWDER, LYOPHILIZED, FOR SOLUTION INTRAVENOUS at 02:44

## 2022-09-27 ASSESSMENT — ENCOUNTER SYMPTOMS: FACIAL SWELLING: 1

## 2022-09-27 NOTE — ED TRIAGE NOTES
Patient developed a abscess on the right lower jaw. He drained it with a razor blade 2 weeks ago and has tried to drain it 5 more times. His right side of his face has now become swollen and the pain is traveling up the right side of his face and down the his right neck and towards his right shoulder. He tried to drain the abscess tonight before coming in and reports the drainage was yellow, brown and black.      Triage Assessment     Row Name 09/26/22 2048       Respiratory WDL    Respiratory WDL X  SOB       Cardiac WDL    Cardiac WDL WDL       Cognitive/Neuro/Behavioral WDL    Cognitive/Neuro/Behavioral WDL X  headache    Level of Consciousness alert

## 2022-09-27 NOTE — ED PROVIDER NOTES
EMERGENCY DEPARTMENT ENCOUNTER      NAME: Burke Domingo  AGE: 37 year old male  YOB: 1985  MRN: [id number]  EVALUATION DATE & TIME: No admission date for patient encounter.    PCP: System, Provider Not In    ED PROVIDER: Valeriano Villareal M.D.    Chief Complaint   Patient presents with     Facial Swelling       FINAL IMPRESSION:  1. Acute abscess of face    2. Facial cellulitis        ED COURSE & MEDICAL DECISION MAKING:    Pertinent Labs & Imaging studies personally reviewed and interpreted by me. (See chart for details)  2:08 AM Patient seen and examined, prior records reviewed. PPE worn including N95 mask, surgical gloves.  Patient presents with swelling of the right mandible that he says been going on for about 2 weeks, started as a pimple.  Denies tooth pain.  CT scan demonstrates a cutaneous abscess, also findings consistent with possible periapical abscess but again, patient says this started as a pimple and he has no dental pain or tenderness to percussion on exam.  We will plan to perform incision and drainage on the abscess, start antibiotics, and close follow-up.  2:19 AM lidocaine 1% injected  2:40 AM Performed an incision and drainage procedure.    At the conclusion of the encounter I discussed the results of all of the tests and the disposition. The questions were answered. The patient or family acknowledged understanding and was agreeable with the care plan.     PROCEDURES:   Procedures  PROCEDURE: Incision and Drainage   INDICATIONS: Localized abscess   PROCEDURE PROVIDER: Dr Valeriano Villareal   SITE: Right mandible   MEDICATION: 1 mLs of 1% Lidocaine with epinephrine   NOTE: The area was prepped with betadine and draped off in the usual sterile fashion.  Local anesthetic was injected subcutaneously with anesthesia effects demonstrated prior to proceeding.  The area of maximal fluctuance was opened with a # 11 Blade ([place]) using a single straight incision to allow for  "drainage.  The abscess was drained.  The abscess cavity was bluntly explored to separate any loculations. Plain gauze was placed into the abscess cavity.  A sterile dressing was placed over the area.   COMPLEXITY: Complex. Loculated, broken up with hemostatin. Purulent and bloody drainage.    Simple = single, furuncle, paronychia, superficial  Complex = multiple or abscess requiring probing, loculations, packing placement   COMPLICATIONS: Patient tolerated procedure well, without complication         MEDICATIONS GIVEN IN THE EMERGENCY:  Medications   piperacillin-tazobactam (ZOSYN) 3.375 g vial to attach to  mL bag (3.375 g Intravenous New Bag 9/27/22 0244)   iopamidol (ISOVUE-370) solution 100 mL (100 mLs Intravenous Given 9/27/22 0009)   ketorolac (TORADOL) injection 15 mg (15 mg Intravenous Given 9/27/22 0242)       NEW PRESCRIPTIONS STARTED AT TODAY'S ER VISIT  New Prescriptions    AMOXICILLIN-CLAVULANATE (AUGMENTIN) 875-125 MG TABLET    Take 1 tablet by mouth 2 times daily for 10 days    DOXYCYCLINE HYCLATE (VIBRAMYCIN) 100 MG CAPSULE    Take 1 capsule (100 mg) by mouth 2 times daily for 10 days    HYDROCODONE-ACETAMINOPHEN (NORCO) 5-325 MG TABLET    Take 1 tablet by mouth every 6 hours as needed for severe pain       =================================================================    HPI    Patient information was obtained from: Patient      Burke Domingo is a 37 year old male with no recorded pertinent medical history who presents to this ED by walk in for evaluation of facial swelling, possible abscess. Patient reports he noticed a possible abscess on his right molar for the past 2 weeks which he first noticed was a \"bump\". Patient states he has attempted to drain this abscess with a razor blade multiple times, but reports \"it keeps coming back\". Patient reports associated right sided facial swelling. Triage note states the patient attempted to drain the possible abscess earlier tonight which " "produced \"yellow, brown and black\" drainage.    Patient denies dental pain. No other reported complaints at this time. Patient is otherwise healthy and does not take any daily medications.    Per chart review, patient has been seen 3 times in the ED within the past 6 months for dental issues. On 05/25/22, patient presented to [facility] ED with facial swelling. On exam, patient had significant decay and fracture at tooth #31 without any noted abscess noted at the time. Patient was given a dose of clindamycin in the ED and discharged with a 7 day course of this. Patient had been evaluated for this in the past, but had not followed up with a dentist at that time.    REVIEW OF SYSTEMS   Review of Systems   HENT: Positive for facial swelling (right lower face). Negative for dental problem (possible abscess to right lower molar).       All other systems reviewed and negative    PAST MEDICAL HISTORY:  History reviewed. No pertinent past medical history.    PAST SURGICAL HISTORY:  History reviewed. No pertinent surgical history.    CURRENT MEDICATIONS:    Current Facility-Administered Medications   Medication     piperacillin-tazobactam (ZOSYN) 3.375 g vial to attach to  mL bag     Current Outpatient Medications   Medication     amoxicillin-clavulanate (AUGMENTIN) 875-125 MG tablet     doxycycline hyclate (VIBRAMYCIN) 100 MG capsule     HYDROcodone-acetaminophen (NORCO) 5-325 MG tablet     chlorhexidine (PERIDEX) 0.12 % solution       ALLERGIES:  Allergies   Allergen Reactions     Morphine Shortness Of Breath and Rash     Apple [Apple Fruit Extract] Unknown       FAMILY HISTORY:  History reviewed. No pertinent family history.    SOCIAL HISTORY:   Social History     Socioeconomic History     Marital status:        VITALS:  BP (!) 148/95   Pulse 100   Temp 98.7  F (37.1  C)   Resp 22   Ht 1.702 m (5' 7\")   Wt 81.6 kg (180 lb)   SpO2 98%   BMI 28.19 kg/m      PHYSICAL EXAM:  Physical Exam  Vitals and " nursing note reviewed.   Constitutional:       Appearance: Normal appearance.   HENT:      Head: Normocephalic and atraumatic.      Comments: 2 cm area of right mandibular swelling and erythema.     Right Ear: External ear normal.      Left Ear: External ear normal.      Nose: Nose normal.      Mouth/Throat:      Mouth: Mucous membranes are moist.   Eyes:      Extraocular Movements: Extraocular movements intact.      Conjunctiva/sclera: Conjunctivae normal.      Pupils: Pupils are equal, round, and reactive to light.   Cardiovascular:      Rate and Rhythm: Normal rate and regular rhythm.   Pulmonary:      Effort: Pulmonary effort is normal.      Breath sounds: Normal breath sounds. No wheezing or rales.   Abdominal:      General: Abdomen is flat. There is no distension.      Palpations: Abdomen is soft.      Tenderness: There is no abdominal tenderness. There is no guarding.   Musculoskeletal:         General: Normal range of motion.      Cervical back: Normal range of motion and neck supple.      Right lower leg: No edema.      Left lower leg: No edema.   Lymphadenopathy:      Cervical: No cervical adenopathy.   Skin:     General: Skin is warm and dry.   Neurological:      General: No focal deficit present.      Mental Status: He is alert and oriented to person, place, and time. Mental status is at baseline.      Comments: No gross focal neurologic deficits   Psychiatric:         Mood and Affect: Mood normal.         Behavior: Behavior normal.         Thought Content: Thought content normal.          LAB:  All pertinent labs reviewed and interpreted.  Results for orders placed or performed during the hospital encounter of 09/26/22   CT Facial Bones with Contrast    Impression    IMPRESSION:   1.  Periapical abscesses/lucencies involving the two right posterior mandibular molars with inflammatory changes extending across the masseter to the superficial surface where there is a large abscess and cellulitis.   CBC (+  platelets, no diff)   Result Value Ref Range    WBC Count 11.0 4.0 - 11.0 10e3/uL    RBC Count 4.55 4.40 - 5.90 10e6/uL    Hemoglobin 15.6 13.3 - 17.7 g/dL    Hematocrit 44.9 40.0 - 53.0 %    MCV 99 78 - 100 fL    MCH 34.3 (H) 26.5 - 33.0 pg    MCHC 34.7 31.5 - 36.5 g/dL    RDW 12.8 10.0 - 15.0 %    Platelet Count 195 150 - 450 10e3/uL   Basic metabolic panel   Result Value Ref Range    Sodium 141 136 - 145 mmol/L    Potassium 3.4 3.4 - 5.3 mmol/L    Chloride 99 98 - 107 mmol/L    Carbon Dioxide (CO2) 30 (H) 22 - 29 mmol/L    Anion Gap 12 7 - 15 mmol/L    Urea Nitrogen 4.4 (L) 6.0 - 20.0 mg/dL    Creatinine 0.71 0.67 - 1.17 mg/dL    Calcium 9.4 8.6 - 10.0 mg/dL    Glucose 93 70 - 99 mg/dL    GFR Estimate >90 >60 mL/min/1.73m2   Extra Blue Top Tube   Result Value Ref Range    Hold Specimen JIC    Extra Red Top Tube   Result Value Ref Range    Hold Specimen JIC      RADIOLOGY:  Reviewed all pertinent imaging. Please see official radiology report.  CT Facial Bones with Contrast   Final Result   IMPRESSION:    1.  Periapical abscesses/lucencies involving the two right posterior mandibular molars with inflammatory changes extending across the masseter to the superficial surface where there is a large abscess and cellulitis.      I, Danny Mcqueen, am serving as a scribe to document services personally performed by Dr. Villareal based on my observation and the provider's statements to me. I, Valeriano Villareal MD attest that Danny Mcqueen is acting in a scribe capacity, has observed my performance of the services and has documented them in accordance with my direction.    Valeriano Villareal M.D.  Emergency Medicine  [facility] EMERGENCY DEPARTMENT  [facility] [facility] [id number] [phone]  Dept: [phone]     Valeriano Villareal MD  09/27/22 0482

## 2022-09-27 NOTE — Clinical Note
Burke Domingo was seen and treated in our emergency department on 9/26/2022.  He may return to work on 09/29/2022.       If you have any questions or concerns, please don't hesitate to call.      Valeriano Villareal MD

## 2023-06-06 ENCOUNTER — HOSPITAL ENCOUNTER (EMERGENCY)
Facility: CLINIC | Age: 38
Discharge: HOME | End: 2023-06-06
Attending: EMERGENCY MEDICINE | Admitting: EMERGENCY MEDICINE
Payer: OTHER MISCELLANEOUS

## 2023-06-06 VITALS
HEART RATE: 112 BPM | WEIGHT: 177 LBS | DIASTOLIC BLOOD PRESSURE: 80 MMHG | RESPIRATION RATE: 19 BRPM | SYSTOLIC BLOOD PRESSURE: 139 MMHG | TEMPERATURE: 97.5 F | BODY MASS INDEX: 27.72 KG/M2 | OXYGEN SATURATION: 99 %

## 2023-06-06 DIAGNOSIS — S62.609D: ICD-10-CM

## 2023-06-06 PROCEDURE — 250N000013 HC RX MED GY IP 250 OP 250 PS 637: Performed by: EMERGENCY MEDICINE

## 2023-06-06 PROCEDURE — 99284 EMERGENCY DEPT VISIT MOD MDM: CPT | Mod: 25

## 2023-06-06 PROCEDURE — 29125 APPL SHORT ARM SPLINT STATIC: CPT

## 2023-06-06 RX ORDER — ACETAMINOPHEN 325 MG/1
650 TABLET ORAL ONCE
Status: COMPLETED | OUTPATIENT
Start: 2023-06-06 | End: 2023-06-06

## 2023-06-06 RX ORDER — CEPHALEXIN 500 MG/1
500 CAPSULE ORAL ONCE
Status: COMPLETED | OUTPATIENT
Start: 2023-06-06 | End: 2023-06-06

## 2023-06-06 RX ORDER — OXYCODONE HYDROCHLORIDE 5 MG/1
5 TABLET ORAL ONCE
Status: COMPLETED | OUTPATIENT
Start: 2023-06-06 | End: 2023-06-06

## 2023-06-06 RX ADMIN — CEPHALEXIN 500 MG: 500 CAPSULE ORAL at 19:17

## 2023-06-06 RX ADMIN — ACETAMINOPHEN 650 MG: 325 TABLET ORAL at 18:27

## 2023-06-06 RX ADMIN — OXYCODONE HYDROCHLORIDE 5 MG: 5 TABLET ORAL at 18:28

## 2023-06-06 ASSESSMENT — ACTIVITIES OF DAILY LIVING (ADL): ADLS_ACUITY_SCORE: 35

## 2023-06-06 NOTE — ED NOTES
Sustained crush injury to left hand yesterday at work.  Was seen at [facility] for the initial injury.  Here today because dressing is now saturated with blood.  Needs antibiotics and pain control with referral to Hand surgeon.

## 2023-06-06 NOTE — ED PROVIDER NOTES
EMERGENCY DEPARTMENT ENCOUNTER      NAME: Burke Domingo  AGE: 37 year old male  YOB: 1985  MRN: [id number]  EVALUATION DATE & TIME: 2023  5:32 PM    PCP: Sintia Cruz    ED PROVIDER: Anil Maynard M.D.      Chief Complaint   Patient presents with     Hand Pain     Hand Injury         FINAL IMPRESSION:  1. Multiple open fractures of fingers, with routine healing, subsequent encounter          ED COURSE & MEDICAL DECISION MAKIN year old male presents to the Emergency Department for evaluation of hand injury.  Patient had been seen yesterday at the [facility] emergency department for a crush injury of multiple fingers on his left hand.  Unfortunately I cannot see the care everywhere information related to that visit but I do have the patient's AVS which includes an x-ray interpretation.  He has multiple complex repaired lacerations to his hand with underlying fractures of finger arlyn on his second third and fourth digit as well as complex fractures of his proximal phalanges.  He was told that he will likely require reconstructive surgery and was referred to orthopedics through [facility].  He presents today due to pain and difficulty with obtaining his prescriptions.  I did take down the splint and dressing which had been in place since yesterday.  Most of the inner bandages were soaked with clotted blood and the fingers themselves were fairly macerated.  We did clean things up, soak them in Hibiclens and saline, and rinse everything out again.  Allow the digits to dry and the perfusion of all digits seems to be preserved.  I do not see any overt evidence of infection, it looks like he was given a dose of Ancef yesterday and has had difficulty with obtaining the follow-up cephalexin that was prescribed for him.  He is now accompanied by his uncle who is helping him navigate the Worker's Comp. paperwork.  I gave him a dose of cephalexin here in the emergency department.  I gave him  additional dressing supplies and resplinted all of the digits, all wounds appear to be hemostatic, I added some extra padding to hopefully make this more manageable for him.  They apparently have a follow-up coming up next Monday with [facility] which I do think seems like a reasonable interval assuming that things remain stable.  They were asking about alternate providers they might be able to follow-up with so I did give them information for [place] and surgery as well if they want to pursue this.  I did review how to dress the wounds and gave them supplies, it sounds like they have some already at home.  We did review the importance of obtaining at least his prophylactic antibiotics and starting this soon as possible tomorrow morning and they think I will be able to accomplish this.  Patient was discharged in stable condition.    5:36 PM Met with patient for initial interview and exam. Discussed initial plan for care for their stay in the emergency department.  6:52 PM I reassessed the patient.   7:18 PM Updated patient on results. Discussed and reviewed discharge plans, answered all questions. The patient agreed to the plan for discharge. Return precautions discussed.    At the conclusion of the encounter I discussed the results of all of the tests and the disposition. The questions were answered. The patient or family acknowledged understanding and was agreeable with the care plan.       Medical Decision Making    History:    Supplemental history from: Documented in chart, if applicable and Family Member/Significant Other    External Record(s) reviewed: Documented in chart, if applicable. and Outpatient Record: I reviewed the patients outpatient records.    Work Up:    Chart documentation includes differential considered and any EKGs or imaging independently interpreted by provider, where specified.    In additional to work up documented, I considered the following work up: Documented in chart, if  applicable. and Imaging XR, but deferred due to No new injury, able to review interpretation from yesterday..    External consultation:    Discussion of management with another provider: Documented in chart, if applicable    Complicating factors:    Care impacted by chronic illness: N/A    Care affected by social determinants of health: N/A    Disposition considerations: Discharge. I recommended the patient continue their current prescription strength medication(s): percocet, Keflex. See documentation for any additional details.            MEDICATIONS GIVEN IN THE EMERGENCY:  Medications   oxyCODONE (ROXICODONE) tablet 5 mg (5 mg Oral $Given 6/6/23 1828)   acetaminophen (TYLENOL) tablet 650 mg (650 mg Oral $Given 6/6/23 1827)   cephALEXin (KEFLEX) capsule 500 mg (500 mg Oral $Given 6/6/23 1917)       NEW PRESCRIPTIONS STARTED AT TODAY'S ER VISIT  Discharge Medication List as of 6/6/2023  7:22 PM             =================================================================    HPI    Patient information was obtained from: patient    Use of : N/A        Burke Domingo is a 37 year old male with no pertinent history who presents to this ED via walk in for evaluation of a hand injury    Per chart review,  The patient was seen at [facility] on 06/05 for an evaluation of an injury. The patient reported that he was at work working with metal when his left three fingers in between thumb and pinky were crushed. The patient has 19 stitches and was referred to the ED if the injury increased in swelling or bleeding.     Since being discharged yesterday evening the patient has endorsed increased bleeding and swelling to his left hand injury. The patient reports that he was prescribed cephalexin and oxycodone but was unable to fill these at the pharmacy because he did not have a claim number from his work since the incident occurred at work. The patient denies any pain medication today.     REVIEW OF SYSTEMS   All  systems reviewed and negative except as noted in HPI.    PAST MEDICAL HISTORY:  No past medical history on file.    PAST SURGICAL HISTORY:  No past surgical history on file.        CURRENT MEDICATIONS:    No current facility-administered medications for this encounter.     Current Outpatient Medications   Medication     chlorhexidine (PERIDEX) 0.12 % solution         ALLERGIES:  Allergies   Allergen Reactions     Morphine Shortness Of Breath and Rash     Apple [Apple Fruit Extract] Unknown       FAMILY HISTORY:  No family history on file.    SOCIAL HISTORY:   Social History     Socioeconomic History     Marital status:        VITALS:  /80 (BP Location: Left arm, Patient Position: Semi-Huizar's, Cuff Size: Adult Regular)   Pulse 112   Temp 97.5  F (36.4  C) (Temporal)   Resp 19   Wt 80.3 kg (177 lb)   SpO2 99%   BMI 27.72 kg/m      PHYSICAL EXAM    Constitutional: Well developed, Well nourished, NAD.  HENT: Normocephalic, Atraumatic. Neck Supple.  Eyes: EOMI, Conjunctiva normal.  Respiratory: Breathing comfortably on room air. Speaks full sentences easily. Lungs clear to ascultation.  Cardiovascular: Normal heart rate, Regular rhythm. No peripheral edema.  Abdomen: Soft  Musculoskeletal: There is expected swelling to the left second, third, and fourth digit.  Upon removal of the splint the skin especially on the palmar surface of these digits is fairly macerated.  The complex lacerations, primarily on the palmar side of the digits, are repaired. They areall appearing clean, with minimal venous oozing at some sites.  Limited ability to flex or extend these digits secondary to pain.  There is no erythema warmth or drainage.  The remainder of the hand is mildly swollen distally but not warm or erythematous.  Integument: Warm, Dry.  Neurologic: Alert & awake, Normal motor function, Normal sensory function, No focal deficits noted.   Psychiatric: Cooperative. Affect appropriate.     EKG:     None    PROCEDURES:     PROCEDURE: Splint Placement   INDICATIONS:  Multiple fractures of the left second, third, fourth digit   PROCEDURE PROVIDER: Dr Van Maynard   NOTE:  A Digital splint made of Prefabricated fiberglass material with padding was applied to the Left upper extremity by the above provider. The splint was checked and the fit was adjusted to ensure proper positioning after placement. Sensation and circulation, as well as motor function, are unchanged after splint placement and the patient is comfortable with the splint in place.         I, Jocelyn Weaver, am serving as a scribe to document services personally performed by Dr. Anil Maynard, based on my observation and the provider's statements to me. I, Anil Maynard MD attest that Jocelyn Weaver is acting in a scribe capacity, has observed my performance of the services and has documented them in accordance with my direction.    Anil Maynard M.D.  Emergency Medicine  [facility] EMERGENCY ROOM  [address] [place] [address] [id number] [phone]  Dept: [phone]       Anil Maynard MD  06/07/23 0929

## 2023-06-06 NOTE — ED TRIAGE NOTES
Patient presents to the ED with a known crush injury to his left three fingers in between the pinky and thumb. His hand was wrapped and was sent home with prescription for cephalexin and oxycodone. Was unable to fill because he didn't have a claim number from the [employer] since this happened at his work. Patient is also bleeding through his band aid that was placed yesterday.

## 2023-06-07 NOTE — DISCHARGE INSTRUCTIONS
You were seen in the emergency department for concerns about injury to your left fingers.  Your evaluation here looks stable from yesterday.  We did redress and spint your injury.  We do not see any evidence of active infection at this time.  We gave you another dose of oral antibiotics which should be enough to tide you through tomorrow.  We would agree with the recommendation to take prophylactic Keflex as previously prescribed.  You can continue daily dressing changes in the manner that we discussed here, if the dressing is soaking through or you have other concerns you can do this twice a day if needed.  Please monitor for redness or severe purulent drainage and return to the ED if you have any other concerns about infection.  We agree with your plan to follow-up as soon as possible with an orthopedist.  We attached the number for [facility]. They have an excellent hand team and would be another appropriate group to follow you up for this injury. They may not be able to see you sooner than the [facility] was planning for, but you can see them if preferred.

## 2023-06-12 ENCOUNTER — TRANSFERRED RECORDS (OUTPATIENT)
Dept: HEALTH INFORMATION MANAGEMENT | Facility: CLINIC | Age: 38
End: 2023-06-12

## 2023-06-26 ENCOUNTER — TRANSFERRED RECORDS (OUTPATIENT)
Dept: HEALTH INFORMATION MANAGEMENT | Facility: CLINIC | Age: 38
End: 2023-06-26

## 2023-07-08 ENCOUNTER — APPOINTMENT (OUTPATIENT)
Dept: CT IMAGING | Facility: CLINIC | Age: 38
End: 2023-07-08
Attending: EMERGENCY MEDICINE
Payer: COMMERCIAL

## 2023-07-08 ENCOUNTER — HOSPITAL ENCOUNTER (EMERGENCY)
Facility: CLINIC | Age: 38
Discharge: HOME | End: 2023-07-08
Attending: EMERGENCY MEDICINE | Admitting: EMERGENCY MEDICINE
Payer: COMMERCIAL

## 2023-07-08 ENCOUNTER — APPOINTMENT (OUTPATIENT)
Dept: RADIOLOGY | Facility: CLINIC | Age: 38
End: 2023-07-08
Attending: EMERGENCY MEDICINE
Payer: COMMERCIAL

## 2023-07-08 ENCOUNTER — ANCILLARY PROCEDURE (OUTPATIENT)
Dept: ULTRASOUND IMAGING | Facility: CLINIC | Age: 38
End: 2023-07-08
Attending: EMERGENCY MEDICINE
Payer: COMMERCIAL

## 2023-07-08 VITALS
OXYGEN SATURATION: 96 % | SYSTOLIC BLOOD PRESSURE: 121 MMHG | HEART RATE: 90 BPM | WEIGHT: 180 LBS | TEMPERATURE: 98.3 F | HEIGHT: 67 IN | RESPIRATION RATE: 18 BRPM | BODY MASS INDEX: 28.25 KG/M2 | DIASTOLIC BLOOD PRESSURE: 79 MMHG

## 2023-07-08 DIAGNOSIS — S00.83XA FACIAL CONTUSION, INITIAL ENCOUNTER: ICD-10-CM

## 2023-07-08 DIAGNOSIS — S00.81XA FACIAL ABRASION, INITIAL ENCOUNTER: ICD-10-CM
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
## 2023-07-08 DIAGNOSIS — S06.0X0A CONCUSSION WITHOUT LOSS OF CONSCIOUSNESS, INITIAL ENCOUNTER: ICD-10-CM

## 2023-07-08 DIAGNOSIS — S40.011A CONTUSION OF RIGHT SHOULDER, INITIAL ENCOUNTER: ICD-10-CM

## 2023-07-08 PROCEDURE — 93005 ELECTROCARDIOGRAM TRACING: CPT | Performed by: EMERGENCY MEDICINE

## 2023-07-08 PROCEDURE — 93308 TTE F-UP OR LMTD: CPT

## 2023-07-08 PROCEDURE — 76705 ECHO EXAM OF ABDOMEN: CPT

## 2023-07-08 PROCEDURE — 76857 US EXAM PELVIC LIMITED: CPT

## 2023-07-08 PROCEDURE — 99285 EMERGENCY DEPT VISIT HI MDM: CPT | Mod: 25

## 2023-07-08 PROCEDURE — 70450 CT HEAD/BRAIN W/O DYE: CPT

## 2023-07-08 PROCEDURE — 70486 CT MAXILLOFACIAL W/O DYE: CPT

## 2023-07-08 PROCEDURE — 72125 CT NECK SPINE W/O DYE: CPT

## 2023-07-08 PROCEDURE — 71046 X-RAY EXAM CHEST 2 VIEWS: CPT

## 2023-07-08 ASSESSMENT — ENCOUNTER SYMPTOMS
NECK PAIN: 0
ABDOMINAL PAIN: 0
FATIGUE: 1
SHORTNESS OF BREATH: 0

## 2023-07-08 ASSESSMENT — ACTIVITIES OF DAILY LIVING (ADL): ADLS_ACUITY_SCORE: 35

## 2023-07-09 LAB
ATRIAL RATE - MUSE: 100 BPM
DIASTOLIC BLOOD PRESSURE - MUSE: NORMAL MMHG
INTERPRETATION ECG - MUSE: NORMAL
P AXIS - MUSE: 27 DEGREES
PR INTERVAL - MUSE: 128 MS
QRS DURATION - MUSE: 86 MS
QT - MUSE: 340 MS
QTC - MUSE: 438 MS
R AXIS - MUSE: 23 DEGREES
SYSTOLIC BLOOD PRESSURE - MUSE: NORMAL MMHG
T AXIS - MUSE: 26 DEGREES
VENTRICULAR RATE- MUSE: 100 BPM

## 2023-07-09 NOTE — ED NOTES
"EDT reports \"pt appears pretty altered\" during EKG; he states that pt appears \"spacey\" and gave his birthday incorrectly the first time the EDT asked. MD updated, will continue to monitor, no interventions at this time as pt is en route to CT already  "

## 2023-07-09 NOTE — ED NOTES
Pt is alert and oriented, Ver[address] 15; pt denies pain, vision changes, LOC, any numbness/tingling or other gross neuro deficits. No neck pain; some dried blood to Rt ear but pinna is still attached normally.
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
## 2023-07-09 NOTE — ED NOTES
Back from imaging, pt is alert & oriented. Pt care order for PO challenge, planning to do after CT results are back.
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
## 2023-07-10 ENCOUNTER — PATIENT OUTREACH (OUTPATIENT)
Dept: CARE COORDINATION | Facility: CLINIC | Age: 38
End: 2023-07-10

## 2023-07-10 ENCOUNTER — TRANSFERRED RECORDS (OUTPATIENT)
Dept: HEALTH INFORMATION MANAGEMENT | Facility: CLINIC | Age: 38
End: 2023-07-10
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
## 2023-07-11 ENCOUNTER — PATIENT OUTREACH (OUTPATIENT)
Dept: CARE COORDINATION | Facility: CLINIC | Age: 38
End: 2023-07-11
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
## 2023-07-12 ENCOUNTER — PATIENT OUTREACH (OUTPATIENT)
Dept: CARE COORDINATION | Facility: CLINIC | Age: 38
End: 2023-07-12
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
## 2023-07-24 ENCOUNTER — TRANSFERRED RECORDS (OUTPATIENT)
Dept: HEALTH INFORMATION MANAGEMENT | Facility: CLINIC | Age: 38
End: 2023-07-24

## 2023-08-28 ENCOUNTER — TRANSFERRED RECORDS (OUTPATIENT)
Dept: HEALTH INFORMATION MANAGEMENT | Facility: CLINIC | Age: 38
End: 2023-08-28

## 2023-11-13 ENCOUNTER — TRANSFERRED RECORDS (OUTPATIENT)
Dept: HEALTH INFORMATION MANAGEMENT | Facility: CLINIC | Age: 38
End: 2023-11-13

## 2024-05-31 ENCOUNTER — HOSPITAL ENCOUNTER (OUTPATIENT)
Facility: HOSPITAL | Age: 39
Setting detail: OBSERVATION
Discharge: LEFT BEFORE DISCHARGE | End: 2024-06-01
Attending: EMERGENCY MEDICINE | Admitting: INTERNAL MEDICINE

## 2024-05-31 DIAGNOSIS — L03.90 CELLULITIS, UNSPECIFIED CELLULITIS SITE: ICD-10-CM

## 2024-05-31 LAB
ALBUMIN SERPL BCG-MCNC: 3.5 G/DL (ref 3.5–5.2)
ALP SERPL-CCNC: 79 U/L (ref 40–150)
ALT SERPL W P-5'-P-CCNC: 12 U/L (ref 0–70)
ANION GAP SERPL CALCULATED.3IONS-SCNC: 12 MMOL/L (ref 7–15)
APTT PPP: 27 SECONDS (ref 22–38)
AST SERPL W P-5'-P-CCNC: 24 U/L (ref 0–45)
BASOPHILS # BLD AUTO: 0.1 10E3/UL (ref 0–0.2)
BASOPHILS NFR BLD AUTO: 1 %
BILIRUB DIRECT SERPL-MCNC: <0.2 MG/DL (ref 0–0.3)
BILIRUB SERPL-MCNC: 0.3 MG/DL
BUN SERPL-MCNC: 13.9 MG/DL (ref 6–20)
CALCIUM SERPL-MCNC: 8.9 MG/DL (ref 8.6–10)
CHLORIDE SERPL-SCNC: 100 MMOL/L (ref 98–107)
CK SERPL-CCNC: 81 U/L (ref 39–308)
CREAT SERPL-MCNC: 1.11 MG/DL (ref 0.67–1.17)
CRP SERPL-MCNC: 27.8 MG/L
DEPRECATED HCO3 PLAS-SCNC: 24 MMOL/L (ref 22–29)
EGFRCR SERPLBLD CKD-EPI 2021: 88 ML/MIN/1.73M2
EOSINOPHIL # BLD AUTO: 0 10E3/UL (ref 0–0.7)
EOSINOPHIL NFR BLD AUTO: 0 %
ERYTHROCYTE [DISTWIDTH] IN BLOOD BY AUTOMATED COUNT: 12.5 % (ref 10–15)
ERYTHROCYTE [SEDIMENTATION RATE] IN BLOOD BY WESTERGREN METHOD: 35 MM/HR (ref 0–15)
GLUCOSE SERPL-MCNC: 90 MG/DL (ref 70–99)
HCT VFR BLD AUTO: 42.3 % (ref 40–53)
HGB BLD-MCNC: 14 G/DL (ref 13.3–17.7)
HOLD SPECIMEN: NORMAL
IMM GRANULOCYTES # BLD: 0.1 10E3/UL
IMM GRANULOCYTES NFR BLD: 1 %
INR PPP: 1.03 (ref 0.85–1.15)
LACTATE SERPL-SCNC: 1.4 MMOL/L (ref 0.7–2)
LYMPHOCYTES # BLD AUTO: 2.4 10E3/UL (ref 0.8–5.3)
LYMPHOCYTES NFR BLD AUTO: 18 %
MCH RBC QN AUTO: 30.6 PG (ref 26.5–33)
MCHC RBC AUTO-ENTMCNC: 33.1 G/DL (ref 31.5–36.5)
MCV RBC AUTO: 92 FL (ref 78–100)
MONOCYTES # BLD AUTO: 0.7 10E3/UL (ref 0–1.3)
MONOCYTES NFR BLD AUTO: 6 %
NEUTROPHILS # BLD AUTO: 9.7 10E3/UL (ref 1.6–8.3)
NEUTROPHILS NFR BLD AUTO: 74 %
NRBC # BLD AUTO: 0 10E3/UL
NRBC BLD AUTO-RTO: 0 /100
PLATELET # BLD AUTO: 243 10E3/UL (ref 150–450)
POTASSIUM SERPL-SCNC: 3.7 MMOL/L (ref 3.4–5.3)
PROT SERPL-MCNC: 8.3 G/DL (ref 6.4–8.3)
RBC # BLD AUTO: 4.58 10E6/UL (ref 4.4–5.9)
SODIUM SERPL-SCNC: 136 MMOL/L (ref 135–145)
WBC # BLD AUTO: 13 10E3/UL (ref 4–11)

## 2024-05-31 PROCEDURE — G0378 HOSPITAL OBSERVATION PER HR: HCPCS

## 2024-05-31 PROCEDURE — 96374 THER/PROPH/DIAG INJ IV PUSH: CPT

## 2024-05-31 PROCEDURE — 99222 1ST HOSP IP/OBS MODERATE 55: CPT | Mod: FS | Performed by: INTERNAL MEDICINE

## 2024-05-31 PROCEDURE — 82550 ASSAY OF CK (CPK): CPT

## 2024-05-31 PROCEDURE — 85610 PROTHROMBIN TIME: CPT

## 2024-05-31 PROCEDURE — 99207 PR APP CREDIT; MD BILLING SHARED VISIT: CPT

## 2024-05-31 PROCEDURE — 87205 SMEAR GRAM STAIN: CPT

## 2024-05-31 PROCEDURE — 250N000013 HC RX MED GY IP 250 OP 250 PS 637

## 2024-05-31 PROCEDURE — 85652 RBC SED RATE AUTOMATED: CPT

## 2024-05-31 PROCEDURE — 99285 EMERGENCY DEPT VISIT HI MDM: CPT | Mod: 25

## 2024-05-31 PROCEDURE — 83605 ASSAY OF LACTIC ACID: CPT

## 2024-05-31 PROCEDURE — 87389 HIV-1 AG W/HIV-1&-2 AB AG IA: CPT

## 2024-05-31 PROCEDURE — 80048 BASIC METABOLIC PNL TOTAL CA: CPT

## 2024-05-31 PROCEDURE — 86140 C-REACTIVE PROTEIN: CPT

## 2024-05-31 PROCEDURE — 96375 TX/PRO/DX INJ NEW DRUG ADDON: CPT

## 2024-05-31 PROCEDURE — 85025 COMPLETE CBC W/AUTO DIFF WBC: CPT

## 2024-05-31 PROCEDURE — 258N000003 HC RX IP 258 OP 636

## 2024-05-31 PROCEDURE — 36415 COLL VENOUS BLD VENIPUNCTURE: CPT

## 2024-05-31 PROCEDURE — 84450 TRANSFERASE (AST) (SGOT): CPT

## 2024-05-31 PROCEDURE — 85730 THROMBOPLASTIN TIME PARTIAL: CPT

## 2024-05-31 PROCEDURE — 36415 COLL VENOUS BLD VENIPUNCTURE: CPT | Performed by: STUDENT IN AN ORGANIZED HEALTH CARE EDUCATION/TRAINING PROGRAM

## 2024-05-31 PROCEDURE — 86038 ANTINUCLEAR ANTIBODIES: CPT

## 2024-05-31 PROCEDURE — 86160 COMPLEMENT ANTIGEN: CPT

## 2024-05-31 PROCEDURE — 86431 RHEUMATOID FACTOR QUANT: CPT

## 2024-05-31 PROCEDURE — 250N000011 HC RX IP 250 OP 636

## 2024-05-31 PROCEDURE — 87040 BLOOD CULTURE FOR BACTERIA: CPT

## 2024-05-31 RX ORDER — VANCOMYCIN HYDROCHLORIDE 1 G/200ML
1000 INJECTION, SOLUTION INTRAVENOUS EVERY 12 HOURS
Status: DISCONTINUED | OUTPATIENT
Start: 2024-06-01 | End: 2024-06-01 | Stop reason: HOSPADM

## 2024-05-31 RX ORDER — CEFAZOLIN SODIUM 1 G/50ML
1750 SOLUTION INTRAVENOUS ONCE
Status: COMPLETED | OUTPATIENT
Start: 2024-05-31 | End: 2024-05-31

## 2024-05-31 RX ORDER — HYDROMORPHONE HYDROCHLORIDE 2 MG/1
2 TABLET ORAL EVERY 4 HOURS PRN
Status: DISCONTINUED | OUTPATIENT
Start: 2024-05-31 | End: 2024-06-01 | Stop reason: HOSPADM

## 2024-05-31 RX ORDER — NALOXONE HYDROCHLORIDE 0.4 MG/ML
0.2 INJECTION, SOLUTION INTRAMUSCULAR; INTRAVENOUS; SUBCUTANEOUS
Status: DISCONTINUED | OUTPATIENT
Start: 2024-05-31 | End: 2024-06-01 | Stop reason: HOSPADM

## 2024-05-31 RX ORDER — ONDANSETRON 2 MG/ML
4 INJECTION INTRAMUSCULAR; INTRAVENOUS EVERY 6 HOURS PRN
Status: DISCONTINUED | OUTPATIENT
Start: 2024-05-31 | End: 2024-06-01 | Stop reason: HOSPADM

## 2024-05-31 RX ORDER — AMOXICILLIN 250 MG
1 CAPSULE ORAL 2 TIMES DAILY PRN
Status: DISCONTINUED | OUTPATIENT
Start: 2024-05-31 | End: 2024-06-01 | Stop reason: HOSPADM

## 2024-05-31 RX ORDER — ACETAMINOPHEN 650 MG/1
650 SUPPOSITORY RECTAL EVERY 4 HOURS PRN
Status: DISCONTINUED | OUTPATIENT
Start: 2024-05-31 | End: 2024-06-01 | Stop reason: HOSPADM

## 2024-05-31 RX ORDER — ONDANSETRON 4 MG/1
4 TABLET, ORALLY DISINTEGRATING ORAL EVERY 6 HOURS PRN
Status: DISCONTINUED | OUTPATIENT
Start: 2024-05-31 | End: 2024-06-01 | Stop reason: HOSPADM

## 2024-05-31 RX ORDER — NALOXONE HYDROCHLORIDE 0.4 MG/ML
0.4 INJECTION, SOLUTION INTRAMUSCULAR; INTRAVENOUS; SUBCUTANEOUS
Status: DISCONTINUED | OUTPATIENT
Start: 2024-05-31 | End: 2024-06-01 | Stop reason: HOSPADM

## 2024-05-31 RX ORDER — ACETAMINOPHEN 325 MG/1
650 TABLET ORAL EVERY 4 HOURS PRN
Status: DISCONTINUED | OUTPATIENT
Start: 2024-05-31 | End: 2024-06-01 | Stop reason: HOSPADM

## 2024-05-31 RX ORDER — AMOXICILLIN 250 MG
2 CAPSULE ORAL 2 TIMES DAILY PRN
Status: DISCONTINUED | OUTPATIENT
Start: 2024-05-31 | End: 2024-06-01 | Stop reason: HOSPADM

## 2024-05-31 RX ORDER — SODIUM CHLORIDE 9 MG/ML
INJECTION, SOLUTION INTRAVENOUS CONTINUOUS
Status: DISCONTINUED | OUTPATIENT
Start: 2024-05-31 | End: 2024-06-01 | Stop reason: HOSPADM

## 2024-05-31 RX ORDER — KETOROLAC TROMETHAMINE 15 MG/ML
15 INJECTION, SOLUTION INTRAMUSCULAR; INTRAVENOUS ONCE
Status: COMPLETED | OUTPATIENT
Start: 2024-05-31 | End: 2024-05-31

## 2024-05-31 RX ADMIN — HYDROMORPHONE HYDROCHLORIDE 2 MG: 2 TABLET ORAL at 22:30

## 2024-05-31 RX ADMIN — SODIUM CHLORIDE 1750 MG: 9 INJECTION, SOLUTION INTRAVENOUS at 20:44

## 2024-05-31 RX ADMIN — KETOROLAC TROMETHAMINE 15 MG: 15 INJECTION, SOLUTION INTRAMUSCULAR; INTRAVENOUS at 19:56

## 2024-05-31 ASSESSMENT — ACTIVITIES OF DAILY LIVING (ADL)
ADLS_ACUITY_SCORE: 23
ADLS_ACUITY_SCORE: 35

## 2024-05-31 ASSESSMENT — COLUMBIA-SUICIDE SEVERITY RATING SCALE - C-SSRS
6. HAVE YOU EVER DONE ANYTHING, STARTED TO DO ANYTHING, OR PREPARED TO DO ANYTHING TO END YOUR LIFE?: NO
2. HAVE YOU ACTUALLY HAD ANY THOUGHTS OF KILLING YOURSELF IN THE PAST MONTH?: NO
1. IN THE PAST MONTH, HAVE YOU WISHED YOU WERE DEAD OR WISHED YOU COULD GO TO SLEEP AND NOT WAKE UP?: NO

## 2024-05-31 NOTE — ED PROVIDER NOTES
"Emergency Department Midlevel Supervisory Note     I had a face to face encounter with this patient seen by the Advanced Practice Provider (ADAM). I personally made/approved the management plan and take responsibility for the patient management. I personally saw patient and performed a substantive portion of the visit including all aspects of the medical decision making.     ED Course:  6:26 PM  Stephanie Chi PA-C staffed patient with me. I agree with their assessment and plan of management, and I will see the patient.  7:20 PM I met with the patient to introduce myself, gather additional history, perform my initial exam, and discuss the plan.     Brief HPI:     Burke Domingo is a 38 year old male who presents for evaluation of wound infection. Patient reports having a black spider bite on his hands starting last week. The reason for the arrival to ED today is that they have migrated to legs, and several on lower abdomin. There is redness surrounding the bites.     I, Samanta Dumont, am serving as a scribe to document services personally performed by Dilma Kruse, based on my observations and the provider's statements to me.   I, Dilma Kruse MD attest that Dilma Kruse was acting in a scribe capacity, has observed my performance of the services and has documented them in accordance with my direction.    Brief Physical Exam: /79 (BP Location: Left arm)   Pulse 91   Temp 98.3  F (36.8  C) (Oral)   Resp 20   Ht 1.676 m (5' 6\")   Wt 74.8 kg (165 lb)   SpO2 97%   BMI 26.63 kg/m    Constitutional:  Alert, in no acute distress  Awake alert nontoxic.  Multiple ulcerations located in the suprapubic area the lower EXTR extremities hands left side of the head.  No rashes on the palms or the soles.  Appears unkept.  Neck is supple.                                     MDM:  38-year-old male presents complaint of multiple lacerations.  Initially thought to be a spider bite but patient has ulcerations " throughout.  He is nontoxic.  No coagulopathy.  Denies drug use.  No recent travel.  No rashes on the palms of the soles.  Denies history of STD.  Superimposed cellulitis.  Will treat him as such.      1. Cellulitis, unspecified cellulitis site        Consults:      Labs and Imaging:  Results for orders placed or performed during the hospital encounter of 05/31/24   Extra Blood Culture Bottle   Result Value Ref Range    Hold Specimen JIC    Extra Blue Top Tube   Result Value Ref Range    Hold Specimen JIC    Extra Red Top Tube   Result Value Ref Range    Hold Specimen JIC    Extra Green Top (Lithium Heparin) Tube   Result Value Ref Range    Hold Specimen JIC    Extra Purple Top Tube   Result Value Ref Range    Hold Specimen JIC    Extra Green Top (Lithium Heparin) ON ICE   Result Value Ref Range    Hold Specimen JIC    Basic metabolic panel   Result Value Ref Range    Sodium 136 135 - 145 mmol/L    Potassium 3.7 3.4 - 5.3 mmol/L    Chloride 100 98 - 107 mmol/L    Carbon Dioxide (CO2) 24 22 - 29 mmol/L    Anion Gap 12 7 - 15 mmol/L    Urea Nitrogen 13.9 6.0 - 20.0 mg/dL    Creatinine 1.11 0.67 - 1.17 mg/dL    GFR Estimate 88 >60 mL/min/1.73m2    Calcium 8.9 8.6 - 10.0 mg/dL    Glucose 90 70 - 99 mg/dL   Lactic acid whole blood   Result Value Ref Range    Lactic Acid 1.4 0.7 - 2.0 mmol/L   Result Value Ref Range    INR 1.03 0.85 - 1.15   Partial thromboplastin time   Result Value Ref Range    aPTT 27 22 - 38 Seconds   CBC with platelets and differential   Result Value Ref Range    WBC Count 13.0 (H) 4.0 - 11.0 10e3/uL    RBC Count 4.58 4.40 - 5.90 10e6/uL    Hemoglobin 14.0 13.3 - 17.7 g/dL    Hematocrit 42.3 40.0 - 53.0 %    MCV 92 78 - 100 fL    MCH 30.6 26.5 - 33.0 pg    MCHC 33.1 31.5 - 36.5 g/dL    RDW 12.5 10.0 - 15.0 %    Platelet Count 243 150 - 450 10e3/uL    % Neutrophils 74 %    % Lymphocytes 18 %    % Monocytes 6 %    % Eosinophils 0 %    % Basophils 1 %    % Immature Granulocytes 1 %    NRBCs per 100 WBC  0 <1 /100    Absolute Neutrophils 9.7 (H) 1.6 - 8.3 10e3/uL    Absolute Lymphocytes 2.4 0.8 - 5.3 10e3/uL    Absolute Monocytes 0.7 0.0 - 1.3 10e3/uL    Absolute Eosinophils 0.0 0.0 - 0.7 10e3/uL    Absolute Basophils 0.1 0.0 - 0.2 10e3/uL    Absolute Immature Granulocytes 0.1 <=0.4 10e3/uL    Absolute NRBCs 0.0 10e3/uL   Result Value Ref Range    CK 81 39 - 308 U/L   Erythrocyte sedimentation rate auto   Result Value Ref Range    Erythrocyte Sedimentation Rate 35 (H) 0 - 15 mm/hr   Result Value Ref Range    CRP Inflammation 27.80 (H) <5.00 mg/L   Hepatic panel   Result Value Ref Range    Protein Total 8.3 6.4 - 8.3 g/dL    Albumin 3.5 3.5 - 5.2 g/dL    Bilirubin Total 0.3 <=1.2 mg/dL    Alkaline Phosphatase 79 40 - 150 U/L    AST 24 0 - 45 U/L    ALT 12 0 - 70 U/L    Bilirubin Direct <0.20 0.00 - 0.30 mg/dL       I have reviewed the relevant laboratory studies above.    I independently interpreted the following imaging study(s):       EKG: I reviewed and independently interpreted the patient's EKG, with comments made as listed below. Please see scanned EKG for full report.       Procedures:  I was present for the key portions of procedures documented in ADAM/midlevel note, see midlevel note for further details.    Dilma Kruse MD  [facility] EMERGENCY DEPARTMENT  [address] [place] [id number] [phone]       Dilma Kruse MD  05/31/24 9290

## 2024-05-31 NOTE — ED TRIAGE NOTES
Triage Assessment (Adult)       Row Name 05/31/24 4151          Triage Assessment    Airway WDL WDL        Respiratory WDL    Respiratory WDL WDL        Skin Circulation/Temperature WDL    Skin Circulation/Temperature WDL WDL        Cardiac WDL    Cardiac WDL WDL        Peripheral/Neurovascular WDL    Peripheral Neurovascular WDL WDL        Cognitive/Neuro/Behavioral WDL    Cognitive/Neuro/Behavioral WDL WDL                   Started having open wounds on hands last week which have moved to legs, several on low ABD, and one on face. Unclear if these are bites, however, they look very red surrounding the wounds.

## 2024-06-01 VITALS
TEMPERATURE: 98.3 F | WEIGHT: 165 LBS | DIASTOLIC BLOOD PRESSURE: 81 MMHG | BODY MASS INDEX: 26.52 KG/M2 | HEART RATE: 57 BPM | RESPIRATION RATE: 18 BRPM | OXYGEN SATURATION: 94 % | HEIGHT: 66 IN | SYSTOLIC BLOOD PRESSURE: 133 MMHG

## 2024-06-01 LAB
AMPHETAMINES UR QL SCN: ABNORMAL
ANION GAP SERPL CALCULATED.3IONS-SCNC: 9 MMOL/L (ref 7–15)
BARBITURATES UR QL SCN: ABNORMAL
BENZODIAZ UR QL SCN: ABNORMAL
BUN SERPL-MCNC: 12 MG/DL (ref 6–20)
BZE UR QL SCN: ABNORMAL
C TRACH DNA SPEC QL PROBE+SIG AMP: NEGATIVE
CALCIUM SERPL-MCNC: 8.2 MG/DL (ref 8.6–10)
CANNABINOIDS UR QL SCN: ABNORMAL
CHLORIDE SERPL-SCNC: 105 MMOL/L (ref 98–107)
CREAT SERPL-MCNC: 1.01 MG/DL (ref 0.67–1.17)
DEPRECATED HCO3 PLAS-SCNC: 27 MMOL/L (ref 22–29)
EGFRCR SERPLBLD CKD-EPI 2021: >90 ML/MIN/1.73M2
ERYTHROCYTE [DISTWIDTH] IN BLOOD BY AUTOMATED COUNT: 12.5 % (ref 10–15)
FENTANYL UR QL: ABNORMAL
GLUCOSE SERPL-MCNC: 119 MG/DL (ref 70–99)
HCT VFR BLD AUTO: 36.6 % (ref 40–53)
HGB BLD-MCNC: 11.9 G/DL (ref 13.3–17.7)
HIV 1+2 AB+HIV1 P24 AG SERPL QL IA: NONREACTIVE
MCH RBC QN AUTO: 30.4 PG (ref 26.5–33)
MCHC RBC AUTO-ENTMCNC: 32.5 G/DL (ref 31.5–36.5)
MCV RBC AUTO: 94 FL (ref 78–100)
N GONORRHOEA DNA SPEC QL NAA+PROBE: NEGATIVE
OPIATES UR QL SCN: ABNORMAL
PCP QUAL URINE (ROCHE): ABNORMAL
PLATELET # BLD AUTO: 186 10E3/UL (ref 150–450)
POTASSIUM SERPL-SCNC: 3.3 MMOL/L (ref 3.4–5.3)
POTASSIUM SERPL-SCNC: 3.8 MMOL/L (ref 3.4–5.3)
RBC # BLD AUTO: 3.91 10E6/UL (ref 4.4–5.9)
RHEUMATOID FACT SERPL-ACNC: <10 IU/ML
SODIUM SERPL-SCNC: 141 MMOL/L (ref 135–145)
WBC # BLD AUTO: 8.7 10E3/UL (ref 4–11)

## 2024-06-01 PROCEDURE — 80048 BASIC METABOLIC PNL TOTAL CA: CPT

## 2024-06-01 PROCEDURE — 36415 COLL VENOUS BLD VENIPUNCTURE: CPT | Performed by: INTERNAL MEDICINE

## 2024-06-01 PROCEDURE — 99238 HOSP IP/OBS DSCHRG MGMT 30/<: CPT | Performed by: INTERNAL MEDICINE

## 2024-06-01 PROCEDURE — 87491 CHLMYD TRACH DNA AMP PROBE: CPT

## 2024-06-01 PROCEDURE — 87798 DETECT AGENT NOS DNA AMP: CPT | Performed by: INTERNAL MEDICINE

## 2024-06-01 PROCEDURE — 80375 DRUG/SUBSTANCE NOS 1-3: CPT | Performed by: INTERNAL MEDICINE

## 2024-06-01 PROCEDURE — 86780 TREPONEMA PALLIDUM: CPT | Performed by: INTERNAL MEDICINE

## 2024-06-01 PROCEDURE — 84132 ASSAY OF SERUM POTASSIUM: CPT | Performed by: INTERNAL MEDICINE

## 2024-06-01 PROCEDURE — 87529 HSV DNA AMP PROBE: CPT | Performed by: INTERNAL MEDICINE

## 2024-06-01 PROCEDURE — 36415 COLL VENOUS BLD VENIPUNCTURE: CPT

## 2024-06-01 PROCEDURE — 85027 COMPLETE CBC AUTOMATED: CPT

## 2024-06-01 PROCEDURE — 96376 TX/PRO/DX INJ SAME DRUG ADON: CPT

## 2024-06-01 PROCEDURE — 250N000011 HC RX IP 250 OP 636: Performed by: INTERNAL MEDICINE

## 2024-06-01 PROCEDURE — 99204 OFFICE O/P NEW MOD 45 MIN: CPT | Performed by: INTERNAL MEDICINE

## 2024-06-01 PROCEDURE — 96375 TX/PRO/DX INJ NEW DRUG ADDON: CPT

## 2024-06-01 PROCEDURE — 80307 DRUG TEST PRSMV CHEM ANLYZR: CPT

## 2024-06-01 PROCEDURE — 250N000013 HC RX MED GY IP 250 OP 250 PS 637: Performed by: INTERNAL MEDICINE

## 2024-06-01 PROCEDURE — G0378 HOSPITAL OBSERVATION PER HR: HCPCS

## 2024-06-01 PROCEDURE — 87593 ORTHOPOXVIRUS AMP PRB EACH: CPT | Performed by: INTERNAL MEDICINE

## 2024-06-01 PROCEDURE — 258N000003 HC RX IP 258 OP 636: Performed by: INTERNAL MEDICINE

## 2024-06-01 RX ORDER — POTASSIUM CHLORIDE 1500 MG/1
40 TABLET, EXTENDED RELEASE ORAL ONCE
Status: COMPLETED | OUTPATIENT
Start: 2024-06-01 | End: 2024-06-01

## 2024-06-01 RX ORDER — CEFTRIAXONE 2 G/1
2 INJECTION, POWDER, FOR SOLUTION INTRAMUSCULAR; INTRAVENOUS EVERY 24 HOURS
Status: DISCONTINUED | OUTPATIENT
Start: 2024-06-01 | End: 2024-06-01 | Stop reason: HOSPADM

## 2024-06-01 RX ADMIN — POTASSIUM CHLORIDE 40 MEQ: 1500 TABLET, EXTENDED RELEASE ORAL at 15:35

## 2024-06-01 RX ADMIN — VANCOMYCIN HYDROCHLORIDE 1000 MG: 1 INJECTION, SOLUTION INTRAVENOUS at 09:14

## 2024-06-01 RX ADMIN — SODIUM CHLORIDE: 9 INJECTION, SOLUTION INTRAVENOUS at 00:31

## 2024-06-01 RX ADMIN — CEFTRIAXONE SODIUM 2 G: 2 INJECTION, POWDER, FOR SOLUTION INTRAMUSCULAR; INTRAVENOUS at 15:36

## 2024-06-01 ASSESSMENT — ACTIVITIES OF DAILY LIVING (ADL)
ADLS_ACUITY_SCORE: 23
DEPENDENT_IADLS:: INDEPENDENT

## 2024-06-01 NOTE — SIGNIFICANT EVENT
"Patient is threatening to leave AMA if he can't go outside when he wishes and have a cigarette. Dr. Waldron spoke with patient and he was offered (and refused) a nicotine patch, and physician explained that he couldn't go outside with an IV. Patient is currently waiting for his uncle to get here and decide then if he will stay or go. Patient stated that he didn't expect to stay more than one night and \"can't take this.\"  "

## 2024-06-01 NOTE — PLAN OF CARE
Problem: Adult Inpatient Plan of Care  Goal: Plan of Care Review  Outcome: Progressing     Problem: Skin or Soft Tissue Infection  Goal: Absence of Infection Signs and Symptoms  Outcome: Progressing     Pt has generalized scabs/sores over his neck, lower abdomen, arms, and legs.  Pt reports the sores are itchy and painful.  Right shin reddened, swollen, with reported new numbness/tingling/weakness since the rash started.  Wound cx sent to lab.  Pain managed with po dilaudid given x1.  Afebrile overnight with iv fluids and abx given.

## 2024-06-01 NOTE — PROGRESS NOTES
"Called by RN re patient wanting to smoke and to leave the hospital  Patient keeps saying \"I want a cigarette\", I drink every other day and today is my day to drink and I don't want any replacements.  \"I never signed up to stay her that long, I was told it would be 24 hours\".   Advised that work up is ongoing and we are waiting on test results.  Patient increasingly agitated.  Offered to sign AMA papers but he says he will wait for his uncle to come with the cigarettes.  Patient told he cannot go out and smoke outside.      Deepti Waldron MD      "

## 2024-06-01 NOTE — CONSULTS
Care Management Initial Consult    General Information  Assessment completed with: Patient,    Type of CM/SW Visit: Initial Assessment    Primary Care Provider verified and updated as needed: No   Readmission within the last 30 days: no previous admission in last 30 days      Reason for Consult: financial concerns, insurance concerns  Advance Care Planning: Advance Care Planning Reviewed: no concerns identified          Communication Assessment  Patient's communication style: spoken language (English or Bilingual)    Hearing Difficulty or Deaf: no        Cognitive  Cognitive/Neuro/Behavioral: WDL                      Living Environment:   People in home: other (see comments) (uncle)     Current living Arrangements: [facility] home      Able to return to prior arrangements: yes       Family/Social Support:  Care provided by: self  Provides care for: no one                Description of Support System:           Current Resources:   Patient receiving home care services: No     Community Resources: None  Equipment currently used at home: none  Supplies currently used at home: None    Employment/Financial:  Employment Status: employed full-time        Financial Concerns: none   Referral to Financial Worker: No       Does the patient's insurance plan have a 3 day qualifying hospital stay waiver?  No    Lifestyle & Psychosocial Needs:  Social Determinants of Health     Food Insecurity: Not on file   Depression: Not on file   Housing Stability: Not on file   Tobacco Use: Not on file   Financial Resource Strain: Not on file   Alcohol Use: Not on file   Transportation Needs: Not on file   Physical Activity: Not on file   Interpersonal Safety: Not on file   Stress: Not on file   Social Connections: Not on file   Health Literacy: Not on file       Functional Status:  Prior to admission patient needed assistance:   Dependent ADLs:: Independent  Dependent IADLs:: Independent       Mental Health Status:          Chemical Dependency  Status:                Values/Beliefs:  Spiritual, Cultural Beliefs, Judaism Practices, Values that affect care:                 Additional Information:  Assessed. Consult for financial/insurance issues. Pt lives in a townhouse with his uncle, Epifanio. Pt is independent with ADLs/IADLs. Works and drives. Does not report any financial issues or insurance issues. No referral has been sent to FSW at this time due to pt having insurance on file and reporting no financial/ insurance issues. CM to follow.        Jana Obregon RN
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
## 2024-06-01 NOTE — PHARMACY-VANCOMYCIN DOSING SERVICE
Pharmacy Vancomycin Initial Note  Date of Service May 31, 2024  Patient's  1985  38 year old, male    Indication:  [employer] for MRSA coverage    Current estimated CrCl = Estimated Creatinine Clearance: 96.4 mL/min (based on SCr of 1.11 mg/dL).    Creatinine for last 3 days  2024:  6:09 PM Creatinine 1.11 mg/dL    Recent Vancomycin Level(s) for last 3 days  No results found for requested labs within last 3 days.      Vancomycin IV Administrations (past 72 hours)        No vancomycin orders with administrations in past 72 hours.                    Nephrotoxins and other renal medications (From now, onward)      Start     Dose/Rate Route Frequency Ordered Stop    24  vancomycin (VANCOCIN) 1,750 mg in sodium chloride 0.9 % 500 mL intermittent infusion         1,750 mg  over 2 Hours Intravenous ONCE 24              Contrast Orders - past 72 hours (72h ago, onward)      None                    Plan:  Start vancomycin  1750 mg IV once  Please re-consult pharmacy if vancomycin continues inpatient     Mary Ann Woodward, [facility]

## 2024-06-01 NOTE — MEDICATION SCRIBE - ADMISSION MEDICATION HISTORY
Medication Scribe Admission Medication History    Admission medication history is complete. The information provided in this note is only as accurate as the sources available at the time of the update.    Information Source(s): Patient and CareEverywhere/SureScripts via in-person    Pertinent Information: Pt reports to not be on any medications or currently taking anything.  -only fill hx was cephalexin and percocet in 6/2/23  -pt has allergies to Morphine and apples.    Changes made to PTA medication list:  Added: None  Deleted: Chlorhexidene 0.12%  Changed: None    Allergies reviewed with patient and updates made in EHR: yes    Medication History Completed By: Pavan Chou 5/31/2024 7:53 PM    No outpatient medications have been marked as taking for the 5/31/24 encounter (Hospital Encounter).

## 2024-06-01 NOTE — PROGRESS NOTES
Patient alert, quiet. Napping off and on. Eating and drinking well. Swabs sent to lab, waiting to send urine. Patient states that he doesn't need to void. Vitals stable. Has many scabs/sores--on feet, shin, abdomen, hands, arms. Did not want anything for pain when offered. Does not exhibit any symptoms of drug or alcohol withdrawal. Will monitor.

## 2024-06-01 NOTE — CONSULTS
Consultation - Infectious Disease  [facility]  Burke Domingo,  1985, MRN [id number]    Admitting Dx: Cellulitis, unspecified cellulitis site [L03.90]    PCP: No Ref-Primary, Physician, None       ASSESSMENT   37 yo man admitted with skin lesions, attributed to recent spider bite.    Rash. Multiple crusted lesions on face, hands, suprapubic region, and foot. There is a patch of small vesicles on the left foot that is the freshest lesions, all other lesions are about a week old. Significant other with similar leg lesions, that have resolved. No fevers or systemic symptoms. Pain and itching, relatively mild. HIV test negative.  Drug abuse. Occasional cocaine. Denies IV drug use    Active Problems:    * No active hospital problems. *       PLAN   -Continue vancomycin  -start ceftriaxone   -treponemal screen  -GC/chlamydia pending  -swab left foot lesions for HSV and VZV  -swab left foot and suprapubic lesions for Mpox.  -case d/w MD today regarding possibility of Mpox. Agree with swabbing both areas, and performing additional testing (HSV,VZV,syphilis). They recommend contact precautions for patient. Patient's partner should also be tested if she has active lesions.    Thank you for this consult. Will follow.    Mayank Cox MD  [facility]  Direct messaging: [facility] P[facility]  On-Call ID provider: [phone], option: [phone]    Addendum: I was called by LISA. They are now recommending enhanced respiratory isolation (N95, eye shield, gown, gloves) pending work-up. I let floor RN know of the updated recommendations.  Mayank Cox MD 7:09 PM   ===========================================      Chief Complaint   <principal problem not specified>       HPI     We have been requested by Deepti Waldron MD to evaluate Burke Domingo for the above.    History obtained by patient    Burke Domingo is a 38 year old man without significant medical history admitted with new skin  lesions.  The patient says he was bitten by a spider about a week and a half ago.  His girlfriend was also bitten by a spider.  He developed a lesion between his fourth and fifth finger that opened up and is now crusting.  About 3 to 4 days later he developed several other lesions that started out as red spots and opened up and are crusting over.  These have appeared on his left earlobe, right jawline, hands (multiple lesions), lower abdomen (multiple lesions), and feet.  He does show me a new patch of lesions on the dorsal aspect of his left foot.  He describes pain and itching, but he came to the hospital mostly because the lesions were not getting any better.  He denies any lesions on his genitals.  He says that his girlfriend had fewer lesions that are now going away.    The patient lives in [place].  He is originally from [place].  He is currently on disability.  He has a cat and dog at home, but admits that he does not interact with them very much.  He denies IV drug use.  He has occasional cocaine.  Denies methamphetamines.  Denies any other bites or injuries.  Denies any exposure to anyone with mpox or vesicular lesions.          Review of Systems   Ten systems reviewed and negative except for what is noted in the HPI       Medical History  No past medical history on file. Surgical History  He  has no past surgical history on file.     Social History  Reviewed, and he    Social History     Social History Narrative    Not on file     Family History  family history is not on file.  family history reviewed and is not pertinent to the presenting problem.            Allergies     Allergies   Allergen Reactions    Morphine Shortness Of Breath and Rash    Apple [Apple Fruit Extract] Unknown         Antibiotics   Vancomycin 5/31-    Previous:  None      Physical Exam     Temp:  [98  F (36.7  C)-98.6  F (37  C)] 98.6  F (37  C)  Pulse:  [] 90  Resp:  [16-20] 17  BP: (100-135)/(67-82) 116/75  SpO2:  [95  "%-99 %] 97 %    /75 (BP Location: Left arm)   Pulse 90   Temp 98.6  F (37  C) (Oral)   Resp 17   Ht 1.676 m (5' 6\")   Wt 74.8 kg (165 lb)   SpO2 97%   BMI 26.63 kg/m      GENERAL:  well-developed, well-nourished, lying in bed in no acute distress.   HENT:  Head is normocephalic, atraumatic. Oropharynx is moist without exudates or ulcers.  EYES:  Eyes have anicteric sclerae without conjunctival injection or stigmata of endocarditis.   NECK:  Supple.  LUNGS:  Clear to auscultation.  CARDIOVASCULAR:  Regular rate and rhythm with no murmurs, gallops or rubs.  ABDOMEN:  Normal bowel sounds, soft, nontender. No appreciable hepatosplenomegaly  EXT: Extremities warm and without edema.  SKIN: See pictures below.  Shallow, crusted ulcers in multiple locations of the body: Face, lower abdomen, hands/fingers, shins, feet.  No palmar lesions.  Lesions on the left foot at the base of the fifth toe are vesicular  NEUROLOGIC:  Grossly nonfocal.      Cultures   5/31 blood cultures x 2: No growth to date    No results found for the last 90 days.       Laboratory results     Recent Labs   Lab 06/01/24  0519 05/31/24  1809   WBC 8.7 13.0*   HGB 11.9* 14.0    243       Recent Labs   Lab 06/01/24  0519 05/31/24  1809    136   CO2 27 24   BUN 12.0 13.9   ALBUMIN  --  3.5   ALKPHOS  --  79   ALT  --  12   AST  --  24       Recent Labs   Lab 05/31/24  1809   CRPI 27.80*   SED 35*           Imaging   Radiology results reviewed    No results found.    Data reviewed today: I reviewed all medications, new labs and imaging results over the last 24 hours. I personally reviewed no images or EKG's today.  The patient's care was discussed with the Bedside Nurse, Patient, and [facility] .  "

## 2024-06-01 NOTE — ED NOTES
[facility] ED Handoff Report    ED Chief Complaint: wound check    ED Diagnosis:  (L03.90) Cellulitis, unspecified cellulitis site  Comment:   Plan:        PMH:  No past medical history on file.     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 WBC elevated. Alert and oriented. Reports pain with movement due to lesions    Tests Performed: Done: Labs and Imaging    Treatments Provided:  see charting    Family Dynamics/Concerns: No    Family Updated On Visitor Policy: Yes    Plan of Care Communicated to Family: Yes    Who Was Updated about Plan of Care: sig other    Belongings Checklist Done and Signed by Patient: No    Belongings Sent with Patient: belongings remain with pt(phone and clothing)    Medications sent with patient: vancomycin infusing    Covid: asymptomatic , n/a    Additional Information: PRNs for pain , remind pt not to itch wounds    RN: Sosa Gutierres RN   5/31/2024 8:52 PM

## 2024-06-01 NOTE — ED PROVIDER NOTES
EMERGENCY DEPARTMENT ENCOUNTER      NAME: Burke Domingo  AGE: 38 year old male  YOB: 1985  MRN: [id number]  EVALUATION DATE & TIME: 05/31/24 7:33 PM    PCP: No Ref-Primary, Physician    ED PROVIDER: Stephanie Chi PA-C      CHIEF COMPLAINT:  Wound Infection      FINAL IMPRESSION:  1. Cellulitis, unspecified cellulitis site          ED COURSE & MEDICAL DECISION MAKING:  Pertinent Labs & Imaging studies reviewed. (See chart for details)    The patient is a 38 year old-year-old male with a history of tobacco and alcohol use presenting to the emergency department for evaluation of wound infection.  He initially got what he thinks is a spider bite by a black spider to his left pinky finger 1.5 weeks ago.  His wife had a similar bite around that time but saw black spider by the bed.  Over the past 3-4 days he developed wounds on his right hand, lower abdomen, left leg, right leg, left foot, left ear, and right cheek/jawline.  He developed chills and bodyaches and did note some purulent drainage that was yellow in appearance coming out of some of the wounds.  He denies any dental pain or spots inside his mouth.  He denies history of IV drug use, denies current IV drug use.  He does not have concern for sexually transmitted infections.    Initial vital signs reviewed and all within normal limits.  Patient is afebrile.  On exam, patient is nontoxic-appearing resting in hospital bed in no acute distress.  He has numerous excoriated papules with erythematous blanchable base, some of which have central crusting, no spontaneous purulent drainage noted on my exam.  There is surrounding erythema, increased warmth but no fluctuance especially to confluence of lesions with central scab on his lateral right calf.  Palms and soles spared. Please see photos below for additional detail.    Patient presentation and physical exam most consistent with cellulitis.  The lesions are so extensive that I have lower  clinical suspicion for insect bite although is possible.  Patient is vitally stable and afebrile, however with systemic symptoms at home and borderline tachycardia 103 on presentation today, concern for sepsis. Patient states that he does not have concern for sexually transmitted infection, however would consider possibility of secondary syphilis or disseminated gonorrhea. No evidence of mucous membrane involvement, negative Nikolsky, and no symptoms that would be consistent with SJS/TEN, bullous pemphigoid, or pemphigus vulgaris. No desquamation or systemic symptoms present to indicate TSS. No bullae, crepitus, or pain out of proportion to indicate necrotizing fasciitis. Rash does not appear urticarial with no signs of anaphylaxis. Does not follow dermatomal distribution, minimal concern for herpes zoster. Blanches with pressure, minimal concern for petechial rashes from thrombocytopenia or rickettsial infections.     Plan for admission for IV antibiotics and further workup of cellulitis.  Patient is agreeable with this plan.    At the conclusion of the encounter I discussed the results of all of the tests and the disposition. The questions were answered. The patient or family acknowledged understanding and was agreeable with the care plan.       ED COURSE:  6:20 PM I met and introduced myself to the patient. I gathered initial history and performed an initial physical exam. We discussed options and plan for diagnostics and treatment here in the ED.  6:25 PM I have staffed the patient with Dr. Kruse, ED physician, who has evaluated the patient and agrees with all aspects of today's care.   6:39 PM I rechecked the patient and updated him on the plan for care.   7:36 PM I rechecked the patient with Dr. Kruse  8:00 PM I spoke to pharmacy about medications for the patient.   8:07 PM I took the patient's bandage of his right leg to get a better look at the wound there.   8:49 PM I rechecked the patient and updated  him on the plan for admission.  He is agreeable with plan for admission.      Medical Decision Making  Obtained supplemental history:Supplemental history obtained?: Family Member/Significant Other  Reviewed external records: External records reviewed?: Inpatient Record: ED visit from 09/27/22 acute abscess of face and facial cellulitis  Care impacted by chronic illness:N/A  Care significantly affected by social determinants of health:N/A  Did you consider but not order tests?: Work up considered but not performed and documented in chart, if applicable  Did you interpret images independently?: Independent interpretation of ECG and images noted in documentation, when applicable.  Consultation discussion with other provider:Did you involve another provider (consultant, , pharmacy, etc.)?: I discussed the care with another health care provider, see documentation for details.  Admit.      CRITICAL CARE:  Not applicable      MEDICATIONS GIVEN IN THE EMERGENCY:  Medications   senna-docusate (SENOKOT-S/PERICOLACE) 8.6-50 MG per tablet 1 tablet (has no administration in time range)     Or   senna-docusate (SENOKOT-S/PERICOLACE) 8.6-50 MG per tablet 2 tablet (has no administration in time range)   ondansetron (ZOFRAN ODT) ODT tab 4 mg (has no administration in time range)     Or   ondansetron (ZOFRAN) injection 4 mg (has no administration in time range)   acetaminophen (TYLENOL) tablet 650 mg (has no administration in time range)     Or   acetaminophen (TYLENOL) Suppository 650 mg (has no administration in time range)   HYDROmorphone (DILAUDID) half-tab 1 mg (has no administration in time range)   HYDROmorphone (DILAUDID) tablet 2 mg (2 mg Oral $Given 5/31/24 2230)   naloxone (NARCAN) injection 0.2 mg (has no administration in time range)     Or   naloxone (NARCAN) injection 0.4 mg (has no administration in time range)     Or   naloxone (NARCAN) injection 0.2 mg (has no administration in time range)     Or   naloxone  (NARCAN) injection 0.4 mg (has no administration in time range)   sodium chloride 0.9 % infusion ( Intravenous $New Bag 6/1/24 0031)   vancomycin (VANCOCIN) 1,000 mg in 200 mL dextrose intermittent infusion (has no administration in time range)   ketorolac (TORADOL) injection 15 mg (15 mg Intravenous $Given 5/31/24 1956)   vancomycin (VANCOCIN) 1,750 mg in sodium chloride 0.9 % 500 mL intermittent infusion (1,750 mg Intravenous $Given 5/31/24 2044)       NEW PRESCRIPTIONS STARTED AT TODAY'S ER VISIT  There are no discharge medications for this patient.         =================================================================    HPI    Patient information was obtained from: Patient and wife    Use of Intrepreter: N/A      Burke ROSENDO Domingo is a 38 year old male with no pertinent medical history who presents to the emergency department for evaluation of spider bites.    Patient stated he noticed he had a wound on his left pinky finger about 1.5 weeks ago that he thinks was a spider bite. His wife got a similar wound around the same time and neither of them felt it, but had seen a black spider by their bed. Noted the wounds are painful and initially were healing but have been getting worse over the past 3-4 days. He then got more wounds on his right hand, his stomach, his left leg, then his right leg, his foot, and most recently his left ear and the right cheek. Endorses chills and body aches. Also noted he has had noticed yellow purulent drainage coming out of some of the wounds. Denies any nausea, vomiting, dental pain, dental symptoms, or spots inside his mouth. Denies any IV drug use, current medical issues, regular medications, or immunosuppression. Has not had any new soaps, detergents, pets, or moved households lately. Confirms he used to smoke a pack per day for the past 17 years. Also confirms he drinks about 1.75 L of alcohol every other day currently, and has been doing so for about 3-4 years.  No prior  "alcohol withdrawal seizures.  He has attempted Tylenol and hydrocortisone cream but neither gave much symptom relief.  He does not have concern for sexually transmitted infections.      PAST MEDICAL HISTORY:  No past medical history on file.    PAST SURGICAL HISTORY:  No past surgical history on file.    CURRENT MEDICATIONS:    None       ALLERGIES:  Allergies   Allergen Reactions    Morphine Shortness Of Breath and Rash    Apple [Apple Fruit Extract] Unknown       FAMILY HISTORY:  No family history on file.    SOCIAL HISTORY:        VITALS:    First Vitals:  Patient Vitals for the past 24 hrs:   BP Temp Temp src Pulse Resp SpO2 Height Weight   05/31/24 2337 121/69 98  F (36.7  C) Oral 80 16 97 % -- --   05/31/24 2154 135/79 98.3  F (36.8  C) Oral 91 20 97 % -- --   05/31/24 2153 -- -- -- -- -- -- 1.676 m (5' 6\") --   05/31/24 2130 -- -- -- 103 -- 98 % -- --   05/31/24 2115 125/72 -- -- 87 -- 96 % -- --   05/31/24 2100 125/73 -- -- 85 -- 97 % -- --   05/31/24 2045 135/79 -- -- 92 -- 98 % -- --   05/31/24 2030 135/75 -- -- 88 -- 97 % -- --   05/31/24 2015 135/78 -- -- 93 -- 98 % -- --   05/31/24 2000 129/76 -- -- 90 -- 98 % -- --   05/31/24 1950 128/79 -- -- 95 -- 98 % -- --   05/31/24 1940 -- -- -- 97 -- 98 % -- --   05/31/24 1930 134/82 -- -- 96 -- 99 % -- --   05/31/24 1810 -- -- -- 94 -- 98 % -- --   05/31/24 1800 111/77 -- -- 104 -- 98 % -- --   05/31/24 1745 113/70 -- -- 97 -- 97 % -- --   05/31/24 1728 100/67 98.6  F (37  C) Oral 102 20 95 % -- 74.8 kg (165 lb)       Patient Vitals for the past 24 hrs:   BP Temp Temp src Pulse Resp SpO2 Height Weight   05/31/24 2337 121/69 98  F (36.7  C) Oral 80 16 97 % -- --   05/31/24 2154 135/79 98.3  F (36.8  C) Oral 91 20 97 % -- --   05/31/24 2153 -- -- -- -- -- -- 1.676 m (5' 6\") --   05/31/24 2130 -- -- -- 103 -- 98 % -- --   05/31/24 2115 125/72 -- -- 87 -- 96 % -- --   05/31/24 2100 125/73 -- -- 85 -- 97 % -- --   05/31/24 2045 135/79 -- -- 92 -- 98 % -- -- " "  05/31/24 2030 135/75 -- -- 88 -- 97 % -- --   05/31/24 2015 135/78 -- -- 93 -- 98 % -- --   05/31/24 2000 129/76 -- -- 90 -- 98 % -- --   05/31/24 1950 128/79 -- -- 95 -- 98 % -- --   05/31/24 1940 -- -- -- 97 -- 98 % -- --   05/31/24 1930 134/82 -- -- 96 -- 99 % -- --   05/31/24 1810 -- -- -- 94 -- 98 % -- --   05/31/24 1800 111/77 -- -- 104 -- 98 % -- --   05/31/24 1745 113/70 -- -- 97 -- 97 % -- --   05/31/24 1728 100/67 98.6  F (37  C) Oral 102 20 95 % -- 74.8 kg (165 lb)         PHYSICAL EXAM  VITAL SIGNS: /69 (BP Location: Left arm, Patient Position: Supine, Cuff Size: Adult Regular)   Pulse 80   Temp 98  F (36.7  C) (Oral)   Resp 16   Ht 1.676 m (5' 6\")   Wt 74.8 kg (165 lb)   SpO2 97%   BMI 26.63 kg/m     GENERAL: Awake, alert, answering questions appropriately, in no acute distress.  HEENT: Moist mucous membranes.  No oral lesions.  Posterior oropharynx clear with no erythema, no exudate. No tonsillar hypertrophy. Uvula midline. Tolerating secretions, no drooling.    SPEECH:  Easy to understand speech, Normal volume and reed. Normal phonation.  PULMONARY: No respiratory distress, Breathing comfortably on room air. Lungs clear to auscultation bilaterally.  CARDIOVASCULAR: Regular rate and rhythm, radial pulses present, symmetric, and normal.  ABDOMINAL: Soft, Nondistended, Nontender, No rebound or guarding.  EXTREMITIES: Extremities are warm and well perfused. No lower extremity edema.  NEUROLOGIC: Moving all extremities spontaneously.   SKIN: Exposed areas of skin warm, dry with no active spontaneous drainage.  Numerous papular excoriated lesions some of which are scabbed over with surrounding blanchable erythema and increased warmth as shown below.  No crepitus or fluctuance. There is surrounding erythema, increased warmth but no fluctuance especially to confluence of lesions with central scab on his lateral right calf.  PSYCH: Normal mood and affect.                                     "   RADIOLOGY/LAB:  Reviewed all pertinent imaging. Please see official radiology report.  All pertinent labs reviewed and interpreted.  Results for orders placed or performed during the hospital encounter of 05/31/24   Extra Blood Culture Bottle   Result Value Ref Range    Hold Specimen JIC    Extra Blue Top Tube   Result Value Ref Range    Hold Specimen JIC    Extra Red Top Tube   Result Value Ref Range    Hold Specimen JIC    Extra Green Top (Lithium Heparin) Tube   Result Value Ref Range    Hold Specimen JIC    Extra Purple Top Tube   Result Value Ref Range    Hold Specimen JIC    Extra Green Top (Lithium Heparin) ON ICE   Result Value Ref Range    Hold Specimen JIC    Basic metabolic panel   Result Value Ref Range    Sodium 136 135 - 145 mmol/L    Potassium 3.7 3.4 - 5.3 mmol/L    Chloride 100 98 - 107 mmol/L    Carbon Dioxide (CO2) 24 22 - 29 mmol/L    Anion Gap 12 7 - 15 mmol/L    Urea Nitrogen 13.9 6.0 - 20.0 mg/dL    Creatinine 1.11 0.67 - 1.17 mg/dL    GFR Estimate 88 >60 mL/min/1.73m2    Calcium 8.9 8.6 - 10.0 mg/dL    Glucose 90 70 - 99 mg/dL   Lactic acid whole blood   Result Value Ref Range    Lactic Acid 1.4 0.7 - 2.0 mmol/L   Result Value Ref Range    INR 1.03 0.85 - 1.15   Partial thromboplastin time   Result Value Ref Range    aPTT 27 22 - 38 Seconds   CBC with platelets and differential   Result Value Ref Range    WBC Count 13.0 (H) 4.0 - 11.0 10e3/uL    RBC Count 4.58 4.40 - 5.90 10e6/uL    Hemoglobin 14.0 13.3 - 17.7 g/dL    Hematocrit 42.3 40.0 - 53.0 %    MCV 92 78 - 100 fL    MCH 30.6 26.5 - 33.0 pg    MCHC 33.1 31.5 - 36.5 g/dL    RDW 12.5 10.0 - 15.0 %    Platelet Count 243 150 - 450 10e3/uL    % Neutrophils 74 %    % Lymphocytes 18 %    % Monocytes 6 %    % Eosinophils 0 %    % Basophils 1 %    % Immature Granulocytes 1 %    NRBCs per 100 WBC 0 <1 /100    Absolute Neutrophils 9.7 (H) 1.6 - 8.3 10e3/uL    Absolute Lymphocytes 2.4 0.8 - 5.3 10e3/uL    Absolute Monocytes 0.7 0.0 - 1.3 10e3/uL     Absolute Eosinophils 0.0 0.0 - 0.7 10e3/uL    Absolute Basophils 0.1 0.0 - 0.2 10e3/uL    Absolute Immature Granulocytes 0.1 <=0.4 10e3/uL    Absolute NRBCs 0.0 10e3/uL   Result Value Ref Range    CK 81 39 - 308 U/L   Erythrocyte sedimentation rate auto   Result Value Ref Range    Erythrocyte Sedimentation Rate 35 (H) 0 - 15 mm/hr   Result Value Ref Range    CRP Inflammation 27.80 (H) <5.00 mg/L   Hepatic panel   Result Value Ref Range    Protein Total 8.3 6.4 - 8.3 g/dL    Albumin 3.5 3.5 - 5.2 g/dL    Bilirubin Total 0.3 <=1.2 mg/dL    Alkaline Phosphatase 79 40 - 150 U/L    AST 24 0 - 45 U/L    ALT 12 0 - 70 U/L    Bilirubin Direct <0.20 0.00 - 0.30 mg/dL   Wound Aerobic Bacterial Culture Routine With Gram Stain    Specimen: Leg, Right; Wound   Result Value Ref Range    Gram Stain Result 3+ Gram positive cocci (A)     Gram Stain Result 2+ WBC seen (A)          EKG:  None      PROCEDURES:  None        I, Anai Akbar, am serving as a scribe to document services personally performed by Stephanie Chi PA-C, based on my observation and the provider's statements to me. I, Stephanie Chi PA-C attest that Anai Akbar is acting in a scribe capacity, has observed my performance of the services and has documented them in accordance with my direction.         Stephanie Chi PA-C  Emergency Medicine  [facility] EMERGENCY DEPARTMENT  [address] [place] [id number]  [phone]  Dept: [phone]     Stephanie Chi PA-C  06/01/24 0153

## 2024-06-01 NOTE — PHARMACY-VANCOMYCIN DOSING SERVICE
Pharmacy Vancomycin Initial Note  Date of Service May 31, 2024  Patient's  1985  38 year old, male    Indication: Skin and Soft Tissue Infection    Current estimated CrCl = Estimated Creatinine Clearance: 96.4 mL/min (based on SCr of 1.11 mg/dL).    Creatinine for last 3 days  2024:  6:09 PM Creatinine 1.11 mg/dL    Recent Vancomycin Level(s) for last 3 days  No results found for requested labs within last 3 days.      Vancomycin IV Administrations (past 72 hours)                     vancomycin (VANCOCIN) 1,750 mg in sodium chloride 0.9 % 500 mL intermittent infusion (mg) 1,750 mg Given 24                    Nephrotoxins and other renal medications (From now, onward)      Start     Dose/Rate Route Frequency Ordered Stop    24 0800  vancomycin (VANCOCIN) 1,000 mg in 200 mL dextrose intermittent infusion         1,000 mg  200 mL/hr over 1 Hours Intravenous EVERY 12 HOURS 24              Contrast Orders - past 72 hours (72h ago, onward)      None            [employer] Prediction of Planned Initial Vancomycin Regimen    Loading dose: N/A  Regimen: 1000 mg IV every 12 hours.  Start time: 08:44 on 2024  Exposure target: AUC24 (range)400-600 mg/L.hr   AUC24,ss: 526 mg/L.hr  Probability of AUC24 > 400: 77 %  Ctrough,ss: 16.5 mg/L  Probability of Ctrough,ss > 20: 34 %  Probability of nephrotoxicity (Lodise SOPHY ): 12 %        Plan:  Start vancomycin  1000 mg IV q12h.   Vancomycin monitoring method: AUC  Vancomycin therapeutic monitoring goal: 400-600 mg*h/L  Pharmacy will check vancomycin levels as appropriate in 1-3 Days.    Serum creatinine levels will be ordered a minimum of twice weekly.      Harriet Nevarez, PharmD

## 2024-06-01 NOTE — PROGRESS NOTES
"[facility]    Medicine Progress Note - Hospitalist Service    Date of Admission:  5/31/2024    Assessment & Plan      Burke Domingo is a 38 year old male with no polysubstance use (tobacco, cocaine and alcohol) who presented with multiple progressive widespread wounds associated with chills, headaches, body aches     Wide spread skin wounds.  Patient reports possible  spider bite but lesions are too widespread.  Rule out infectious causes, possible vasculitis, possible xylazine related skin necrosis (patient is a cocaine user).  Send urine to test for xylazine.  Continue Vancomycin for possible superimposed cellulitis.  WOC consult.  ID consulted  Polysubstance use (ETOH, tobacco, cocaine).   Utox positive for cocaine.  Patient denies any IV drug use.   Monitor for ETOH withdrawal  Hypokalemia. Replace per protocol         Observation Goals: -diagnostic tests and consults completed and resulted, -adequate pain control on oral analgesics, -infection is improving, Nurse to notify provider when observation goals have been met and patient is ready for discharge.  Diet: Regular Diet Adult    DVT Prophylaxis: Ambulate every shift  Gonzalez Catheter: Not present  Lines: None     Cardiac Monitoring: None  Code Status: Full Code      Clinically Significant Risk Factors Present on Admission        # Hypokalemia: Lowest K = 3.3 mmol/L in last 2 days, will replace as needed   # Hypocalcemia: Lowest Ca = 8.2 mg/dL in last 2 days, will monitor and replace as appropriate              # Overweight: Estimated body mass index is 26.63 kg/m  as calculated from the following:    Height as of this encounter: 1.676 m (5' 6\").    Weight as of this encounter: 74.8 kg (165 lb).       # Financial/Environmental Concerns: none         Disposition Plan     Medically Ready for Discharge: Anticipated in 2-4 Days             Deepti Waldron MD  Hospitalist Service  [facility]  Securely " message with [employer] (more info)  Text page via [employer] Paging/Directory   ______________________________________________________________________    Interval History   Patient reports ongoing headaches, body aches, chills      Physical Exam   Vital Signs: Temp: 98.6  F (37  C) Temp src: Oral BP: 116/75 Pulse: 90   Resp: 17 SpO2: 97 % O2 Device: None (Room air)    Weight: 165 lbs 0 oz    General Appearance: Sleepy  Respiratory: Easy respirations, lungs are clear anteriorly  Cardiovascular: Regular rate and rhythm.  Normal S1-S2, mild tachycardia  GI: Abdomen soft and nontender  Skin: Multiple papules with excoriations seen over face, arms, abdominal wall, legs.  Lesions on the right dorsal foot with surrounding erythema tracking proximally.  Left lower extremity has quite extensive lesions with small amount of serous drainage       Medical Decision Making       50 MINUTES SPENT BY ME on the date of service doing chart review, history, exam, documentation & further activities per the note.  MANAGEMENT DISCUSSED with the following over the past 24 hours: Patient and nursing staff       Data     I have personally reviewed the following data over the past 24 hrs:    8.7  \   11.9 (L)   / 186     141 105 12.0 /  119 (H)   3.3 (L) 27 1.01 \     ALT: 12 AST: 24 AP: 79 TBILI: 0.3   ALB: 3.5 TOT PROTEIN: 8.3 LIPASE: N/A     Procal: N/A CRP: 27.80 (H) Lactic Acid: 1.4       INR:  1.03 PTT:  27   D-dimer:  N/A Fibrinogen:  N/A       Imaging results reviewed over the past 24 hrs:   No results found for this or any previous visit (from the past 24 hour(s)).

## 2024-06-02 LAB
HSV1 DNA SPEC QL NAA+PROBE: NOT DETECTED
HSV2 DNA SPEC QL NAA+PROBE: NOT DETECTED
Lab: NORMAL
PERFORMING LABORATORY: NORMAL
T PALLIDUM AB SER QL: NONREACTIVE
TEST NAME: NORMAL
VZV DNA SPEC QL NAA+PROBE: NOT DETECTED

## 2024-06-02 NOTE — SIGNIFICANT EVENT
Significant Event Note    Time of event: 8:04 PM June 1, 2024    Description of event:  Paged by RN as patient was threatening and wanting to leave AMA. He was wanting to go outside of the hospital to smoke and drink. I offered nicotine patches, lozenges for patient, but he was not amenable to the offer. Per chart review, it is unadvised to patient to go outside to smoke with IV access given past history.     The patient has decided to leave AMA - we are unable to convince the patient to stay.      The patient requested to leave. I considered this to be leaving against medical advice. I personally discussed the following with them.  That they currently had a medical condition of: wide spread skin wounds currently being treated with vancomycin for possible superimposed cellulitis, hypokalemia, history of substance use  My proposed course of evaluation and treatment currently would be continued monitoring and evaluation in the hospital due to skin infection with concern for superimposed cellulitis, nicotine patch for cravings   Benefits would include:IV antibiotic treatment, monitoring of electrolytes, monitoring of skin wounds/infection   Risks of leaving before this had been completed include: worsening skin infection, blood stream infection, progressive infection leading to death  Despite this they stated they wanted to leave and refused further evaluation, treatment, or admission at this time.  They appear clinically sober, to be mentating appropriately, free from distracting injury, have controlled pain, appear to have intact insight, judgement, and reason and in my opinion have the capacity to make this decision.  Specifically, they were able to verbally state back in a coherent manner their current medical condition/current diagnosis, the proposes course of treatment, and the risks, benefits, and alternatives of treatment versus leaving against medical advice.  They understand that they may return to seek  medical attention here at whatever time they want. I highly advised them to return to the Emergency Department immediately if they experienced any worsening or recurrent symptoms, reconsidered treatment a/o admission, or had any other concerns. This would be without any repercussions.  I recommended they follow-up with primary care provider or return to the emergency department for further evaluation and treatment.    Lee Ann Mayorga DO  House Officer    Discussed with: bedside nurse

## 2024-06-02 NOTE — PROGRESS NOTES
Patient left AMA. House officer paged and spoke with patient regarding risks of leaving AMA. Patient acknowledged and signed AMA form; this was added to paper chart.
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
## 2024-06-03 LAB
ANA SER QL IF: NEGATIVE
BACTERIA WND CULT: ABNORMAL
C3 SERPL-MCNC: 131 MG/DL (ref 81–157)
C4 SERPL-MCNC: 16 MG/DL (ref 13–39)
GRAM STAIN RESULT: ABNORMAL
GRAM STAIN RESULT: ABNORMAL

## 2024-06-05 LAB
BACTERIA BLD CULT: NO GROWTH
BACTERIA BLD CULT: NO GROWTH
ORTHOPOXVIRUS DNA SPEC QL NAA+PROBE: NOT DETECTED
ORTHOPOXVIRUS DNA SPEC QL NAA+PROBE: NOT DETECTED

## 2024-06-12 LAB — MISCELLANEOUS TEST 1 (ARUP): NORMAL
